# Patient Record
Sex: MALE | Race: WHITE | NOT HISPANIC OR LATINO | URBAN - METROPOLITAN AREA
[De-identification: names, ages, dates, MRNs, and addresses within clinical notes are randomized per-mention and may not be internally consistent; named-entity substitution may affect disease eponyms.]

---

## 2022-07-27 ENCOUNTER — EMERGENCY (EMERGENCY)
Facility: HOSPITAL | Age: 82
LOS: 1 days | Discharge: HOME | End: 2022-07-27
Attending: EMERGENCY MEDICINE
Payer: MEDICARE

## 2022-07-27 VITALS
OXYGEN SATURATION: 97 % | WEIGHT: 240.08 LBS | DIASTOLIC BLOOD PRESSURE: 94 MMHG | HEIGHT: 76 IN | RESPIRATION RATE: 18 BRPM | SYSTOLIC BLOOD PRESSURE: 151 MMHG | HEART RATE: 68 BPM | TEMPERATURE: 98 F

## 2022-07-27 PROCEDURE — 99283 EMERGENCY DEPT VISIT LOW MDM: CPT

## 2022-07-27 PROCEDURE — 99053 MED SERV 10PM-8AM 24 HR FAC: CPT

## 2022-07-27 PROCEDURE — 82962 GLUCOSE BLOOD TEST: CPT

## 2022-07-27 NOTE — ED ADULT NURSE NOTE - CHIEF COMPLAINT QUOTE
PT WAS BROUGHT IN BY EMS FROM [facility] AS PT GOT  LOST DRIVING FROM [place] AND ENDED AT [facility]/DENIES ANY COMPLAINT  UPON ED ARRIVAL

## 2022-07-27 NOTE — ED PROVIDER NOTE - OBJECTIVE STATEMENT
Pt with hx of dementia drove from outside of [place] to [facility].   no reported accidents.  Son present and states pt is in base line states of health.  pt is oriented to himself, his son and his .   No complaitns.  Pmhx Dementia, HTN Pt with hx of dementia, drove from outside of [place] to [facility].   No reported accidents.  Son present and states pt is in base line states of health.  Pt is oriented to himself, his son and his .   No complaints.  Pmhx Dementia, HTN

## 2022-07-27 NOTE — ED PROVIDER NOTE - NSFOLLOWUPCLINICS_GEN_ALL_ED_FT
[facility]  Internal Medicine  [address] [place] [address]  Phone: [phone]  Fax: [phone]

## 2022-07-27 NOTE — ED ADULT NURSE NOTE - OBJECTIVE STATEMENT
the  patient is a 81 y male c brought by ems from [facility]  completely disoriented . son was informed and supposed to come and pick him up today morning

## 2022-07-27 NOTE — ED PROVIDER NOTE - CLINICAL SUMMARY MEDICAL DECISION MAKING FREE TEXT BOX
I had a detailed discussion with son regarding the historical points, exam findings, and any diagnostic results supporting the discharge diagnosis. Safe discharge with son.  I had a detailed discussion with son regarding the historical points, exam findings, and any diagnostic results supporting the discharge diagnosis.

## 2022-07-27 NOTE — ED PROVIDER NOTE - NSFOLLOWUPINSTRUCTIONS_ED_ALL_ED_FT
Dementia is a condition that affects the way the brain functions. It often affects memory and thinking. Usually, dementia gets worse with time and cannot be reversed (progressive dementia). There are many types of dementia, including:  •Alzheimer's disease. This type is the most common.      •Vascular dementia. This type may happen as the result of a stroke.      •Lewy body dementia. This type may happen to people who have Parkinson's disease.      •Frontotemporal dementia. This type is caused by damage to nerve cells (neurons) in certain parts of the brain.      Some people may be affected by more than one type of dementia. This is called mixed dementia.      What are the causes?    Dementia is caused by damage to cells in the brain. The area of the brain and the types of cells damaged determine the type of dementia. Usually, this damage is irreversible or cannot be undone. Some examples of irreversible causes include:  •Conditions that affect the blood vessels of the brain, such as diabetes, heart disease, or blood vessel disease.      •Genetic mutations.      In some cases, changes in the brain may be caused by another condition and can be reversed or slowed. Some examples of reversible causes include:  •Injury to the brain.      •Certain medicines.      •Infection, such as meningitis.      •Metabolic problems, such as vitamin B12 deficiency or thyroid disease.      •Pressure on the brain, such as from a tumor, blood clot, or too much fluid in the brain (hydrocephalus).      •Autoimmune diseases that affect the brain or arteries, such as limbic encephalitis or vasculitis.        What are the signs or symptoms?    Symptoms of dementia depend on the type of dementia. Common signs of dementia include problems with remembering, thinking, problem solving, decision making, and communicating. These signs develop slowly or get worse with time. This may include:  •Problems remembering events or people.      •Having trouble taking a bath or putting clothes on.      •Forgetting appointments or forgetting to pay bills.      •Difficulty planning and preparing meals.      •Having trouble speaking.      •Getting lost easily.      •Changes in behavior or mood.        How is this diagnosed?     This condition is diagnosed by a specialist (neurologist). It is diagnosed based on the history of your symptoms, your medical history, a physical exam, and tests. Tests may include:  •Tests to evaluate brain function, such as memory tests, cognitive tests, and other tests.      •Lab tests, such as blood or urine tests.      •Imaging tests, such as a CT scan, a PET scan, or an MRI.      •Genetic testing. This may be done if other family members have a diagnosis of certain types of dementia.      Your health care provider will talk with you and your family, friends, or caregivers about your history and symptoms.      How is this treated?    Treatment for this condition depends on the cause of the dementia. Progressive dementias, such as Alzheimer's disease, cannot be cured, but there may be treatments that help to manage symptoms.    Treatment might involve taking medicines that may help to:  •Control the dementia.      •Slow down the progression of the dementia.      •Manage symptoms.      In some cases, treating the cause of your dementia can improve symptoms, reverse symptoms, or slow down how quickly your dementia becomes worse.    Your health care provider can direct you to support groups, organizations, and other health care providers who can help with decisions about your care.      Follow these instructions at home:    Medicines     •Take over-the-counter and prescription medicines only as told by your health care provider.      •Use a pill organizer or pill reminder to help you manage your medicines.      •Avoid taking medicines that can affect thinking, such as pain medicines or sleeping medicines.      Lifestyle   •Make healthy lifestyle choices.  •Be physically active as told by your health care provider.      •Do not use any products that contain nicotine or tobacco, such as cigarettes, e-cigarettes, and chewing tobacco. If you need help quitting, ask your health care provider.      •Do not drink alcohol.      •Practice stress-management techniques when you get stressed.      •Spend time with other people.      •Make sure to get quality sleep. These tips can help you get a good night's rest:  •Avoid napping during the day.      •Keep your sleeping area dark and cool.      •Avoid exercising during the few hours before you go to bed.      •Avoid caffeine products in the evening.        Eating and drinking     •Drink enough fluid to keep your urine pale yellow.      •Eat a healthy diet.        General instructions      •Work with your health care provider to determine what you need help with and what your safety needs are.      •Talk with your health care provider about whether it is safe for you to drive.      •If you were given a bracelet that identifies you as a person with memory loss or tracks your location, make sure to wear it at all times.      •Work with your family to make important decisions, such as advance directives, medical power of , or a living will.      •Keep all follow-up visits. This is important.        Where to find more information    •Alzheimer's Association: www.alz.org      •National [place] on Aging: www.mirza.nih.gov/alzheimers      •World Health Organization: www.who.int        Contact a health care provider if:    •You have any new or worsening symptoms.      •You have problems with choking or swallowing.        Get help right away if:    •You feel depressed or sad, or feel that you want to harm yourself.      •Your family members become concerned for your safety.      If you ever feel like you may hurt yourself or others, or have thoughts about taking your own life, get help right away. Go to your nearest emergency department or:   • Call your local emergency services (741 in the [place]       • Call a suicide crisis helpline, such as the National Suicide Prevention Lifeline at 1-782.918.1711. This is open 24 hours a day in the [place]       • Text the Crisis Text Line at 694228 (in the [place]         Summary    •Dementia is a condition that affects the way the brain functions. Dementia often affects memory and thinking.      •Usually, dementia gets worse with time and cannot be reversed (progressive dementia).      •Treatment for this condition depends on the cause of the dementia.      •Work with your health care provider to determine what you need help with and what your safety needs are.      •Your health care provider can direct you to support groups, organizations, and other health care providers who can help with decisions about your care.      This information is not intended to replace advice given to you by your health care provider. Make sure you discuss any questions you have with your health care provider.

## 2022-07-27 NOTE — ED ADULT TRIAGE NOTE - CHIEF COMPLAINT QUOTE
PT WAS BROUGHT IN BY EMS FROM [facility] AS PT GOT  LOST DRIVING FROM [place] AND ENDED IN [facility]/DENIES ANY COMPLAINT  UPON ED ARRIVAL PT WAS BROUGHT IN BY EMS FROM [facility] AS PT GOT  LOST DRIVING FROM [place] AND ENDED AT [facility]/DENIES ANY COMPLAINT  UPON ED ARRIVAL

## 2023-08-16 NOTE — ED PROVIDER NOTE - RESPIRATORY NEGATIVE STATEMENT, MLM
Detail Level: Zone
Prescription Strength Graduated Compression Stockings Recommendations: The patient was counseled that prescription strength graduated compression stockings should be worn for all waking hours. They will follow up with a venous specialist to monitor graduated compression stocking usage and their symptoms.
no chest pain, no cough, and no shortness of breath.

## 2024-01-17 VITALS — SYSTOLIC BLOOD PRESSURE: 119 MMHG | DIASTOLIC BLOOD PRESSURE: 67 MMHG | RESPIRATION RATE: 18 BRPM

## 2024-01-17 VITALS — DIASTOLIC BLOOD PRESSURE: 72 MMHG | RESPIRATION RATE: 18 BRPM | SYSTOLIC BLOOD PRESSURE: 124 MMHG

## 2024-01-17 VITALS — RESPIRATION RATE: 20 BRPM | DIASTOLIC BLOOD PRESSURE: 70 MMHG | SYSTOLIC BLOOD PRESSURE: 155 MMHG

## 2024-01-17 VITALS — RESPIRATION RATE: 18 BRPM | DIASTOLIC BLOOD PRESSURE: 73 MMHG | SYSTOLIC BLOOD PRESSURE: 121 MMHG

## 2024-01-17 VITALS — SYSTOLIC BLOOD PRESSURE: 123 MMHG | RESPIRATION RATE: 18 BRPM | DIASTOLIC BLOOD PRESSURE: 76 MMHG

## 2024-01-17 LAB
ALBUMIN SERPL-MCNC: 3.1 G/DL (ref 3.5–5)
ALP SERPL-CCNC: 50 U/L (ref 38–126)
ALT SERPL-CCNC: 19 U/L (ref 0–50)
AST SERPL-CCNC: 32 U/L (ref 17–59)
BUN SERPL-MCNC: 18 MG/DL (ref 9–20)
CALCIUM SERPL-MCNC: 8.9 MG/DL (ref 8.4–10.2)
CHLORIDE SERPL-SCNC: 104 MMOL/L (ref 98–107)
CO2 SERPL-SCNC: 28 MMOL/L (ref 22–30)
EGFR: > 60
ERYTHROCYTE [DISTWIDTH] IN BLOOD BY AUTOMATED COUNT: 15.9 % (ref 11.5–14.5)
FERRITIN SERPL-MCNC: 216 NG/ML (ref 17.9–464)
FOLATE SERPL-MCNC: 12 NG/ML (ref 2.76–20)
GLUCOSE SERPL-MCNC: 96 MG/DL (ref 70–99)
HCT VFR BLD AUTO: 28.1 % (ref 39–52)
HGB BLD-MCNC: 9.4 G/DL (ref 13–18)
IRON SERPL-MCNC: 73 UG/DL (ref 49–181)
MCHC RBC AUTO-ENTMCNC: 33.5 G/DL (ref 33–37)
MCV RBC AUTO: 97.2 FL (ref 80–94)
NRBC BLD AUTO-RTO: 0 %
PLATELET # BLD AUTO: 145 10^3/UL (ref 130–400)
POTASSIUM SERPL-SCNC: 4.5 MMOL/L (ref 3.5–5.1)
PROT SERPL-MCNC: 5.7 G/DL (ref 6.3–8.2)
SODIUM SERPL-SCNC: 137 MMOL/L (ref 135–145)
TIBC SERPL-MCNC: 209 UG/DL (ref 261–462)
VIT B12 SERPL-MCNC: 312 PG/ML (ref 239–931)

## 2024-01-17 RX ADMIN — TRAZODONE HYDROCHLORIDE 100 MG: 100 TABLET ORAL at 22:30

## 2024-01-17 RX ADMIN — ANTIPRURITIC (ANTI-ITCH) 1 APPLIC: 1 CREAM TOPICAL at 22:33

## 2024-01-17 RX ADMIN — TAMSULOSIN HYDROCHLORIDE 0.4 MG: 0.4 CAPSULE ORAL at 22:31

## 2024-01-17 RX ADMIN — CARVEDILOL 25 MG: 12.5 TABLET, FILM COATED ORAL at 22:29

## 2024-01-17 RX ADMIN — Medication 5 MG: at 22:31

## 2024-01-17 RX ADMIN — OLANZAPINE 7.5 MG: 7.5 TABLET, FILM COATED ORAL at 22:31

## 2024-01-17 RX ADMIN — TRAZODONE HYDROCHLORIDE 25 MG: 50 TABLET ORAL at 22:30

## 2024-01-17 RX ADMIN — ACETAMINOPHEN 650 MG: 325 TABLET ORAL at 23:58

## 2024-01-17 NOTE — CM
"Addendum entered by Anna Hull RN  01/17/24 16:53: "~"CM spoke with VIANNEY Mckeon and Neftali,  at [facility]. They are refusing to have patient return. CM updated patient's wife and sister. They will work with CM for placement. "~"Addendum entered by Anna Hull RN  01/17/24 16:22: "~"VELVET spoke with Linda at [facility]. They are planning to speak with patient's daughter to confirm if they are willing to pay for 1:1. CM will await call back. "~"Addendum entered by Anna Hull RN  01/17/24 15:17: "~"VELVET spoke to Linda from [facility]. She discussed patient's disposition with her director Neftail. Neftali feels that patient requires too much care for them to handle patient. Linda will discuss further with Neftali and the patient's daughter to "~"discuss 1:1 plan. Cm will await call back. "~"Original Note:"~"VELVET spoke with Linda from [facility]. She stated that patient has been having frequent falls and they are concerned he needs a higher level of care. "~"Patient's wife and sister are at bedside. They are in agreement with having the patient return to [facility] and will pay for a 1:1. "~"Linda will discuss this discharge plan with her director. CM will await call back. "

## 2024-01-17 NOTE — CM
Patient's family is requesting referral to be sent to [facility]. CM will send referral via [facility].

## 2024-01-18 VITALS — DIASTOLIC BLOOD PRESSURE: 81 MMHG | SYSTOLIC BLOOD PRESSURE: 159 MMHG | RESPIRATION RATE: 20 BRPM

## 2024-01-18 VITALS — DIASTOLIC BLOOD PRESSURE: 81 MMHG | RESPIRATION RATE: 20 BRPM | SYSTOLIC BLOOD PRESSURE: 159 MMHG

## 2024-01-18 VITALS — SYSTOLIC BLOOD PRESSURE: 128 MMHG | RESPIRATION RATE: 18 BRPM | DIASTOLIC BLOOD PRESSURE: 73 MMHG

## 2024-01-18 VITALS — RESPIRATION RATE: 16 BRPM | DIASTOLIC BLOOD PRESSURE: 69 MMHG | SYSTOLIC BLOOD PRESSURE: 156 MMHG

## 2024-01-18 VITALS — DIASTOLIC BLOOD PRESSURE: 83 MMHG | OXYGEN SATURATION: 99 % | SYSTOLIC BLOOD PRESSURE: 140 MMHG

## 2024-01-18 VITALS — OXYGEN SATURATION: 99 % | DIASTOLIC BLOOD PRESSURE: 83 MMHG | SYSTOLIC BLOOD PRESSURE: 140 MMHG

## 2024-01-18 VITALS — DIASTOLIC BLOOD PRESSURE: 64 MMHG | RESPIRATION RATE: 20 BRPM | SYSTOLIC BLOOD PRESSURE: 144 MMHG

## 2024-01-18 VITALS — DIASTOLIC BLOOD PRESSURE: 75 MMHG | RESPIRATION RATE: 18 BRPM | SYSTOLIC BLOOD PRESSURE: 145 MMHG

## 2024-01-18 VITALS — HEART RATE: 69 BPM | DIASTOLIC BLOOD PRESSURE: 76 MMHG | SYSTOLIC BLOOD PRESSURE: 125 MMHG

## 2024-01-18 LAB
BUN SERPL-MCNC: 19 MG/DL (ref 9–20)
CALCIUM SERPL-MCNC: 8.9 MG/DL (ref 8.4–10.2)
CHLORIDE SERPL-SCNC: 101 MMOL/L (ref 98–107)
CO2 SERPL-SCNC: 29 MMOL/L (ref 22–30)
EGFR: > 60
ERYTHROCYTE [DISTWIDTH] IN BLOOD BY AUTOMATED COUNT: 16.1 % (ref 11.5–14.5)
ESTIMATED CREATININE CLEARANCE: 77 ML/MIN
GLUCOSE SERPL-MCNC: 87 MG/DL (ref 70–99)
HCT VFR BLD AUTO: 30 % (ref 39–52)
HGB BLD-MCNC: 9.9 G/DL (ref 13–18)
INR PPP: 1.11
MCHC RBC AUTO-ENTMCNC: 33 G/DL (ref 33–37)
MCV RBC AUTO: 98 FL (ref 80–94)
NRBC BLD AUTO-RTO: 0 %
PLATELET # BLD AUTO: 164 10^3/UL (ref 130–400)
POTASSIUM SERPL-SCNC: 3.9 MMOL/L (ref 3.5–5.1)
PROTHROMBIN TIME: 14.5 SEC (ref 11.4–14.6)
SODIUM SERPL-SCNC: 138 MMOL/L (ref 135–145)
SQUAMOUS URNS QL MICRO: (no result) /LPF
TRANS CELLS UR QL COMP ASSIST: (no result) /LPF
URINE RED BLOOD CELL: (no result) /HPF (ref 0–2)
URINE WHITE CELL: (no result) /HPF (ref 0–5)

## 2024-01-18 RX ADMIN — OLANZAPINE 7.5 MG: 7.5 TABLET, FILM COATED ORAL at 16:25

## 2024-01-18 RX ADMIN — DULOXETINE 20 MG: 20 CAPSULE, DELAYED RELEASE ORAL at 08:24

## 2024-01-18 RX ADMIN — TRAZODONE HYDROCHLORIDE 100 MG: 100 TABLET ORAL at 21:23

## 2024-01-18 RX ADMIN — ACETAMINOPHEN 650 MG: 325 TABLET ORAL at 08:24

## 2024-01-18 RX ADMIN — ANTIPRURITIC (ANTI-ITCH) 1 APPLIC: 1 CREAM TOPICAL at 08:25

## 2024-01-18 RX ADMIN — ACETAMINOPHEN 650 MG: 325 TABLET ORAL at 20:06

## 2024-01-18 RX ADMIN — TRAZODONE HYDROCHLORIDE 25 MG: 50 TABLET ORAL at 21:24

## 2024-01-18 RX ADMIN — ANTIPRURITIC (ANTI-ITCH) 1 APPLIC: 1 CREAM TOPICAL at 20:09

## 2024-01-18 RX ADMIN — ACETAMINOPHEN 650 MG: 325 TABLET ORAL at 04:40

## 2024-01-18 RX ADMIN — ASPIRIN 81 MG: 81 TABLET, COATED ORAL at 08:24

## 2024-01-18 RX ADMIN — CYANOCOBALAMIN 1000 MCG: 1000 INJECTION, SOLUTION INTRAMUSCULAR; SUBCUTANEOUS at 11:12

## 2024-01-18 RX ADMIN — TAMSULOSIN HYDROCHLORIDE 0.4 MG: 0.4 CAPSULE ORAL at 21:25

## 2024-01-18 RX ADMIN — LAMOTRIGINE 150 MG: 100 TABLET ORAL at 08:23

## 2024-01-18 RX ADMIN — ACETAMINOPHEN 650 MG: 325 TABLET ORAL at 16:25

## 2024-01-18 RX ADMIN — OLANZAPINE 7.5 MG: 7.5 TABLET, FILM COATED ORAL at 09:26

## 2024-01-18 RX ADMIN — OLANZAPINE 7.5 MG: 7.5 TABLET, FILM COATED ORAL at 21:24

## 2024-01-18 RX ADMIN — Medication 5 MG: at 21:24

## 2024-01-18 RX ADMIN — ACETAMINOPHEN 650 MG: 325 TABLET ORAL at 11:12

## 2024-01-18 RX ADMIN — CARVEDILOL 25 MG: 12.5 TABLET, FILM COATED ORAL at 08:24

## 2024-01-18 RX ADMIN — AMLODIPINE BESYLATE 5 MG: 5 TABLET ORAL at 08:25

## 2024-01-18 NOTE — PTCARENOTE
"Received pt from ER @ 2230, family at bedside. Pt slid over to bed, immediately trying to get up, yelling and becoming combative with staff. Pt calmed down but continues to attempt to get OOB, bed alarm placed. Pt AAOx0. Pt with L arm skin tear and "~"bruising throughout body. "

## 2024-01-18 NOTE — WOUNDNOTE
WOC RN Note: Assisted nursing staff (VIANNEY Mendez, RN Akash, PCT Adwoa) with switching patient's bed from Versacare Accumax to a Versacare Air bed. heels off bed with air chair cushion. VIANNEY Mendez to apply silicone foam to R elbow scabbed abrasion.

## 2024-01-18 NOTE — CM
"Addendum entered by Nadine Russ  01/18/24 14:07: "~"Patients sister is Izabella, not Anna."~"CM met with Izabella and patients wife, Aminah. Discussed referrals sent to LTC facilities, both Izabella and Aminah do not feel that patient would benefit from SNF due to his behaviors and do not feel it will be helpful. CM sent message to [facility] as "~"families first choice in [facility]. Additional referrals sent to [facility] and [facility], awaiting response. "~"Original Note:"~"CM spoke with patients sister, Anna Negro, reports she is currently staying in [place] with her sister in law, Aminah, and wanted to provide additional contacts (Anna- [phone], Aminah- [phone]). Anna provided CM with patients "~"daughter, Jory España [phone], POA of patient. CM explained to Anna that patient is currently observation status, if going to SNF, would be private pay. Anna reports patient has LTC insurance and Jory handles financial information. Anna "~"request additional referrals to be sent out that are local to [facility]. CM reports no response from [facility] as of yet, will send additional referrals. "~"CM spoke with patients daughter and HECTOR Corley. Per daughter, her mother lives at [facility], where her father was living before entering [facility] 6 months ago. Jory reports they were surprised to hear that patient cannot return to "~"[facility] and are now ltrying to figure out the next steps for patient. CM discussed PT recommendation of SNF, and with LTC policy, not all policies will cover SNF. Daughter reports she is not sure how beneficial a SNF would be for her "~"father with his behaviors and dementia and is looking more for a LTC facility. Daughter reports the LTC policy is through [facility], daughter is going to call to see if SNF is covered. Daughter reports she lives in [place], however has "~"family that are local who visit her father every day. Daughter reports they do not have time to look at facilities and are asking the medical professionals to lead them in the right direction of facilities they should send patient to. CM reports she "~"cannot recommend one facility over the other, provided Medicare.gov website to patient to look at list of facilities. Additional referrals sent to [facility] and [facility]. "~"CM awaiting response of facilities at this time. VELVET will continue to follow for discarge planning needs."~"Plan; LTC pending accepting facility. "

## 2024-01-18 NOTE — WOUNDNOTE
"[facility] RN note: Patient admitted with recurrent falls, L elbow skin tear, R thigh contusion. Patient for SNF placement. "~"See H&P for complete history."~"PMH:  Alzheimer's, HTN, anxiety/depression."~"Wound Location and type/assessment:  Patient admitted with: L elbow dermal skin tear. R elbow scabbed abrasion. L coccyx/buttocks blanchable red skin. Bruises R hip, inner thighs, feet. "~"Appetite:  on regular diet. "~"Pressure redistribution devices in place:   Sarta Accumax. Patient can be uncooperative as per nursing staff. "~"Plan:  L elbow silicone border foam changed L elbow. Protective silicone border foam applied L coccyx/buttocks. Heels elevated off bed. Air chair cushion placed under sacral/buttocks. t/c Bed tech Noman and requested an air mattress. "~"Will confirm orders with hospitalist and discussed with VIANNEY Bustos.   "~"Care plan to be updated and will follow as needed.  "

## 2024-01-18 NOTE — WOUNDNOTE
"[facility] RN note: Patient admitted with recurrent falls, L elbow skin tear, R thigh contusion. Patient for SNF placement. "~"See H&P for complete history."~"PMH:  Alzheimer's, HTN, anxiety/depression."~"Wound Location and type/assessment:  Patient admitted with: L elbow dermal skin tear. L coccyx/buttocks blanchable red skin. Bruises R hip, inner thighs, feet. "~"Appetite:  on regular diet. "~"Pressure redistribution devices in place:   Zaask Accumax. Patient can be uncooperative as per nursing staff. "~"Plan:  L elbow silicone border foam changed L elbow. Protective silicone border foam applied L coccyx/buttocks. Heels elevated off bed. Air chair cushion placed under sacral/buttocks. t/c Bed tech Noman and requested an air mattress. "~"Will confirm orders with hospitalist and discussed with VIANNEY Bustos.   "~"Care plan to be updated and will follow as needed.  "

## 2024-01-19 ENCOUNTER — HOSPITAL ENCOUNTER (INPATIENT)
Dept: HOSPITAL 99 - 2 NORTH | Age: 84
LOS: 56 days | Discharge: HOSPICE | DRG: 57 | End: 2024-03-15
Payer: MEDICARE

## 2024-01-19 VITALS — RESPIRATION RATE: 18 BRPM | SYSTOLIC BLOOD PRESSURE: 152 MMHG | DIASTOLIC BLOOD PRESSURE: 79 MMHG

## 2024-01-19 VITALS — SYSTOLIC BLOOD PRESSURE: 162 MMHG | RESPIRATION RATE: 18 BRPM | DIASTOLIC BLOOD PRESSURE: 80 MMHG

## 2024-01-19 VITALS — BODY MASS INDEX: 26.1 KG/M2 | BODY MASS INDEX: 23.4 KG/M2

## 2024-01-19 VITALS — SYSTOLIC BLOOD PRESSURE: 139 MMHG | RESPIRATION RATE: 16 BRPM | DIASTOLIC BLOOD PRESSURE: 71 MMHG

## 2024-01-19 VITALS — DIASTOLIC BLOOD PRESSURE: 69 MMHG | RESPIRATION RATE: 20 BRPM | SYSTOLIC BLOOD PRESSURE: 157 MMHG

## 2024-01-19 VITALS — DIASTOLIC BLOOD PRESSURE: 81 MMHG | SYSTOLIC BLOOD PRESSURE: 152 MMHG | RESPIRATION RATE: 18 BRPM

## 2024-01-19 VITALS — DIASTOLIC BLOOD PRESSURE: 79 MMHG | RESPIRATION RATE: 20 BRPM | SYSTOLIC BLOOD PRESSURE: 158 MMHG

## 2024-01-19 DIAGNOSIS — R29.6: ICD-10-CM

## 2024-01-19 DIAGNOSIS — E83.110: ICD-10-CM

## 2024-01-19 DIAGNOSIS — D53.9: ICD-10-CM

## 2024-01-19 DIAGNOSIS — Z79.82: ICD-10-CM

## 2024-01-19 DIAGNOSIS — R00.1: ICD-10-CM

## 2024-01-19 DIAGNOSIS — E86.0: ICD-10-CM

## 2024-01-19 DIAGNOSIS — F02.811: ICD-10-CM

## 2024-01-19 DIAGNOSIS — N40.0: ICD-10-CM

## 2024-01-19 DIAGNOSIS — G30.9: Primary | ICD-10-CM

## 2024-01-19 DIAGNOSIS — S51.012A: ICD-10-CM

## 2024-01-19 DIAGNOSIS — I48.21: ICD-10-CM

## 2024-01-19 DIAGNOSIS — G47.00: ICD-10-CM

## 2024-01-19 DIAGNOSIS — I82.411: ICD-10-CM

## 2024-01-19 DIAGNOSIS — D69.59: ICD-10-CM

## 2024-01-19 DIAGNOSIS — F10.10: ICD-10-CM

## 2024-01-19 DIAGNOSIS — L89.322: ICD-10-CM

## 2024-01-19 DIAGNOSIS — S50.312A: ICD-10-CM
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
## 2024-01-19 DIAGNOSIS — S70.11XA: ICD-10-CM

## 2024-01-19 DIAGNOSIS — I10: ICD-10-CM

## 2024-01-19 DIAGNOSIS — D75.1: ICD-10-CM

## 2024-01-19 DIAGNOSIS — F02.83: ICD-10-CM

## 2024-01-19 DIAGNOSIS — M25.551: ICD-10-CM

## 2024-01-19 DIAGNOSIS — F32.A: ICD-10-CM

## 2024-01-19 DIAGNOSIS — Z11.52: ICD-10-CM

## 2024-01-19 DIAGNOSIS — E87.0: ICD-10-CM

## 2024-01-19 DIAGNOSIS — T42.6X5A: ICD-10-CM

## 2024-01-19 DIAGNOSIS — L89.152: ICD-10-CM

## 2024-01-19 DIAGNOSIS — Z75.1: ICD-10-CM

## 2024-01-19 DIAGNOSIS — Z66: ICD-10-CM

## 2024-01-19 DIAGNOSIS — F02.818: ICD-10-CM

## 2024-01-19 DIAGNOSIS — F02.84: ICD-10-CM

## 2024-01-19 DIAGNOSIS — L73.9: ICD-10-CM

## 2024-01-19 DIAGNOSIS — Z85.038: ICD-10-CM

## 2024-01-19 LAB
BUN SERPL-MCNC: 17 MG/DL (ref 9–20)
CALCIUM SERPL-MCNC: 9 MG/DL (ref 8.4–10.2)
CHLORIDE SERPL-SCNC: 101 MMOL/L (ref 98–107)
CO2 SERPL-SCNC: 29 MMOL/L (ref 22–30)
EGFR: > 60
ERYTHROCYTE [DISTWIDTH] IN BLOOD BY AUTOMATED COUNT: 15.9 % (ref 11.5–14.5)
ESTIMATED CREATININE CLEARANCE: 88 ML/MIN
GLUCOSE SERPL-MCNC: 127 MG/DL (ref 70–99)
HCT VFR BLD AUTO: 32.6 % (ref 39–52)
HGB BLD-MCNC: 10.7 G/DL (ref 13–18)
MAGNESIUM SERPL-MCNC: 2.2 MG/DL (ref 1.6–2.3)
MCHC RBC AUTO-ENTMCNC: 32.8 G/DL (ref 33–37)
MCV RBC AUTO: 97.3 FL (ref 80–94)
PLATELET # BLD AUTO: 169 10^3/UL (ref 130–400)
POTASSIUM SERPL-SCNC: 3.8 MMOL/L (ref 3.5–5.1)
SODIUM SERPL-SCNC: 138 MMOL/L (ref 135–145)

## 2024-01-19 RX ADMIN — Medication 5 MG: at 21:54

## 2024-01-19 RX ADMIN — CYANOCOBALAMIN 1000 MCG: 1000 INJECTION, SOLUTION INTRAMUSCULAR; SUBCUTANEOUS at 09:36

## 2024-01-19 RX ADMIN — ANTIPRURITIC (ANTI-ITCH) 1 APPLIC: 1 CREAM TOPICAL at 09:36

## 2024-01-19 RX ADMIN — ACETAMINOPHEN: 325 TABLET ORAL at 01:23

## 2024-01-19 RX ADMIN — ACETAMINOPHEN 650 MG: 325 TABLET ORAL at 14:09

## 2024-01-19 RX ADMIN — TAMSULOSIN HYDROCHLORIDE 0.4 MG: 0.4 CAPSULE ORAL at 21:54

## 2024-01-19 RX ADMIN — ACETAMINOPHEN: 325 TABLET ORAL at 05:08

## 2024-01-19 RX ADMIN — ACETAMINOPHEN 650 MG: 325 TABLET ORAL at 09:36

## 2024-01-19 RX ADMIN — OLANZAPINE 7.5 MG: 7.5 TABLET, FILM COATED ORAL at 09:35

## 2024-01-19 RX ADMIN — ASPIRIN 81 MG: 81 TABLET, COATED ORAL at 09:36

## 2024-01-19 RX ADMIN — ANTIPRURITIC (ANTI-ITCH) 1 APPLIC: 1 CREAM TOPICAL at 21:54

## 2024-01-19 RX ADMIN — AMLODIPINE BESYLATE 5 MG: 5 TABLET ORAL at 09:36

## 2024-01-19 RX ADMIN — ACETAMINOPHEN 650 MG: 325 TABLET ORAL at 21:54

## 2024-01-19 RX ADMIN — LAMOTRIGINE 150 MG: 100 TABLET ORAL at 09:36

## 2024-01-19 RX ADMIN — DULOXETINE 20 MG: 20 CAPSULE, DELAYED RELEASE ORAL at 09:36

## 2024-01-19 RX ADMIN — ACETAMINOPHEN 650 MG: 325 TABLET ORAL at 17:21

## 2024-01-19 NOTE — CON.MD
"Consultation - Medical"~"-"~"-: "~"patient seen chart reviewed. discussed w dr martin. this patient w hx of dementia is admitted after seventeen falls.  he lives in [facility] and due to his repeated falls they are saying they cannot take him back. kamari valdes is "~"being investigated. he has hx dementia and mood disorder.  his current meds include zypexa 7.5 mg tid with a prn for ten more mg  trazodone 125 mg q hs lamictal 150 mg daily and cymbalta 20 mg daily. according to his med list he also had a prn for "~"lomotil up to qid and takes meds for bp including amlodopine and caredilol.  no history can be obtained from patient given his dementia and his speech is very garbled. patient has had periods of agitation where he has been given even more "~"anticholinergic medications eg zyprexa 10 mg im"~"past medical hx  see above re falls.  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  imaging of brain , chest, hip and cervical spine show no new findings.  hbg is 10.7     bp 152/81"~"fh not relevant"~"substance abuse according to chart hx alcoholism in the very distant past"~"social resides in [place].  .  alternate placement being sought"~"mse unable to engage verbally with patient"~"dx dementia  with hx affective d/o and anxiety "~"recommendations  if you are looking for a reason for the falls look no further than trazodone at hs and heavy doses of anticholinergic medications eg lomotil ordered qid for diarrhea not clear how much he is receiving  and zyprexa. the top dose of "~"zyprexa is usually 20 mg for adults and he is as an 83 year old already receiving in a scheduled dose 22.5 mg. he has an order for ten more via 5 mg prn's and received 10 mg in an im dose since coming here.  i would suggest that in addition to falls "~"he could be suffering from akathisia which is a known side effect of antipsychotic medication.  it can be experienced by the patient as agitation which we are seeing although he was calm when i saw him. .  have dc'ed trazodone  use melatonin for "~"sleep . for now would try ativan prn for agitation  which could also increase risk for falls but would not cause akathisia.  would increase lamictal by 25 mg which may help w agitation.  would continue cymbalta for now although i don't know that he "~"is truly depressed.  lastly would avoid lomotil qid. "~"would check b12 and folate if not already checked given macrocytosis . psych will follow"

## 2024-01-19 NOTE — CON.CAR
"Addendum entered and electronically signed by Miguel Tariq MD  01/19/24 11:50: "~"I saw and examined the patient."~"The NP's note was reviewed and I agree with the note."~"Comment: 83M with dementia and falls. We are called with permanent AF that is slow."~"	- carvedilol held -&gt; probably will need some dose going forward, but less than 25"~"	- I see no long pauses or profound bradycardia-&gt; no indication that PPM would help"~"Original Note:"~"Consultation"~"Consultation Request"~"Date/Time Consultation Requested: 1/19/24 9:30a"~"Date/Time Consultation Performed: 1/19/24 10a"~"Requesting Provider: Dr. Gutierrez"~"Performing Provider: VICENTE Prince for Dr. Tariq"~"Reason for Consultation: slow Afib, falls"~"Medical History"~"-"~"Chief Complaint: fall"~"History of Present Illness: "~"Mr. España is an 84 yo male with HTN, Afib not on OAC, anxiety/depression and dementia, who presents to the ER from [facility] unit after a fall.  They report he has fallen about 17 times in the last 2-3 weeks, not always witnessed "~"by staff so unclear if this is syncope.  He is demented, disoriented and combative this am, therefore history is limited and obtained via his medical records.  He is admitted to the hospitalist service and we are consulted for slow Afib.  Tele shows "~"rates as slow as 43 bpm, no significant pauses.   Coreg is being held currently.  According to his medication list, he is only on ASA, no OAC.    "~"Past Medical History"~"Past Medical History: Other (as above)"~"Social History"~"Tobacco: Non-Smoker"~"Alcohol: Former"~"Personal: "~"Living: Nursing Home ([facility])"~"Family History"~"Family History: Unable to Obtain (dementia, disoriented)"~"Allergies / Home Medications"~"-: "~"Allergy/AdvReac	Type	Severity	Reaction	Status	Date / Time"~"No Known Allergies	Allergy			Verified	01/17/24 12:41"~" Medication	 Instructions	 Recorded	 Confirmed	 Type"~"amlodipine 5 mg tablet	5 mg PO DAILY Blood Pressure	11/30/23	01/17/24	History"~"aspirin 81 mg tablet,delayed	81 mg PO DAILY Blood Clot	11/30/23	01/17/24	History"~"release	Prevention/Tx			"~"carvedilol 25 mg tablet	25 mg PO BID Heart Failure	11/30/23	01/17/24	History"~"duloxetine 20 mg capsule,delayed	20 mg PO DAILY Pain	11/30/23	01/17/24	History"~"release				"~"lamotrigine 150 mg tablet	150 mg PO DAILY Neurological	11/30/23	01/17/24	History"~"	Condition			"~"melatonin 5 mg tablet	5 mg PO HS Sleep	11/30/23	01/17/24	History"~"olanzapine 5 mg disintegrating	5 mg PO BIDPRN PRN anxiety	11/30/23	01/17/24	History"~"tablet				"~"potassium chloride 20 mEq	40 meq PO DAILY Electrolyte	11/30/23	01/17/24	History"~"tablet,extended release(part/cryst)	Repletion			"~"tamsulosin 0.4 mg capsule	0.4 mg PO HS Urinary Issue	11/30/23	01/17/24	History"~"trazodone 100 mg tablet	100 mg PO HS Sleep	11/30/23	01/17/24	History"~"trazodone 50 mg tablet	25 mg PO HS Sleep	11/30/23	01/17/24	History"~"diphenoxylate-atropine 2.5	1 tab PO QID diarrhea	01/17/24	01/17/24	History"~"mg-0.025 mg tablet				"~"olanzapine 7.5 mg tablet	7.5 mg PO TID Mental Health/Anxiety	01/17/24	01/17/24	History"~"Review of Systems"~"-"~"Unable to obtain full review of systems at this time due to: Dementia"~"Physical Exam"~"Vital Signs"~"-: "~"Temp	Pulse	Resp	BP	Pulse Ox"~" 97.7 F 	 61 	 20 	 157/69 	 96 "~" 01/19/24 07:30	 01/19/24 07:30	 01/19/24 07:30	 01/19/24 07:30	 01/19/24 07:30"~"Lab Results"~"-: "~"01/19/24 09:08 "~"01/19/24 09:08 "~"Physical Exam"~"General: Well Developed and Other (agitated/combative)"~"HEENT: Normocephalic and Moist Mucous Membranes"~"Respiratory: Other (did not perform as he was agitated/combative)"~"Cardiac: S1/S2 and Irregular Rhythm (slow Afib)"~"Breast: Deferred by me"~"GI: Other (did not perform as he was agitated/combative)"~"Rectal: Deferred by Provider"~"Skin: Warm and Dry"~"Neuro: Awake and Alert (disoriented, agitated, combative)"~"Psych: Agitated (combative)"~"Impression / Plan"~"-"~"-: "~"Falls - recurrent."~"	- sometimes witnessed by staff and others not, unsure if syncope."~"	- monitor on tele."~"	- could be from slow Afib, Coreg held."~"	- he is unable to provide any symptoms or history."~"	- check echo today."~"Afib - unknown chronicity."~"	- slow rates as low as 43 bpm on tele."~"	- monitor on tele."~"	- check echo today."~"	- [id number] VASC score is 3 (age, HTN), he is on ASA and with recurrent falls and dementia he is not a candidate for OAC at this time. "~"HTN - stable on meds, continue. "~"Dementia - chronic. "~"	- lives at [facility]."~"	- agitated and combative today, requires a 1:1.  "~"Data Reviewed"~"-"~"EKG: Tracing Personally Visualized and interpreted (Afib 67 bpm)"~"Radiology: Report Reviewed by me (cxr: nad)"~"Labs: Labs Reviewed by me"

## 2024-01-19 NOTE — CM
"CM spoke with patients daughter, Jory, discussed patient now inpatient status. CM discussed there has still not been an accepting LTC facility. Jory reports [facility] still the [facility] once choice, CM reports the admissions liaison at "~"[facility] is off today but aware that family is extremely interested in facility. oJry reports she will call [facility] to see if they would accept patient as her mother resides there. CM discussed patient can go to facility as skilled and "~"transition into LTC, patient would require three night inpatient stay. CM provided case management contact number as CM is not here this weekend. CM will continue to follow for discharge planning needs."~"Plan; LTC, pending facility acceptance."

## 2024-01-20 VITALS — SYSTOLIC BLOOD PRESSURE: 128 MMHG | DIASTOLIC BLOOD PRESSURE: 64 MMHG | HEART RATE: 66 BPM

## 2024-01-20 VITALS — RESPIRATION RATE: 18 BRPM | DIASTOLIC BLOOD PRESSURE: 73 MMHG | SYSTOLIC BLOOD PRESSURE: 158 MMHG

## 2024-01-20 VITALS — RESPIRATION RATE: 18 BRPM | DIASTOLIC BLOOD PRESSURE: 79 MMHG | SYSTOLIC BLOOD PRESSURE: 137 MMHG

## 2024-01-20 VITALS — DIASTOLIC BLOOD PRESSURE: 83 MMHG | RESPIRATION RATE: 16 BRPM | SYSTOLIC BLOOD PRESSURE: 109 MMHG

## 2024-01-20 VITALS — RESPIRATION RATE: 16 BRPM | DIASTOLIC BLOOD PRESSURE: 73 MMHG | SYSTOLIC BLOOD PRESSURE: 161 MMHG

## 2024-01-20 RX ADMIN — CEPHALEXIN 500 MG: 500 CAPSULE ORAL at 22:14

## 2024-01-20 RX ADMIN — ACETAMINOPHEN 650 MG: 325 TABLET ORAL at 15:04

## 2024-01-20 RX ADMIN — Medication 5 MG: at 22:14

## 2024-01-20 RX ADMIN — WATER 2.1 ML: 1 INJECTION INTRAMUSCULAR; INTRAVENOUS; SUBCUTANEOUS at 18:05

## 2024-01-20 RX ADMIN — ANTIPRURITIC (ANTI-ITCH) 1 APPLIC: 1 CREAM TOPICAL at 22:10

## 2024-01-20 RX ADMIN — CYANOCOBALAMIN 1000 MCG: 1000 INJECTION, SOLUTION INTRAMUSCULAR; SUBCUTANEOUS at 07:54

## 2024-01-20 RX ADMIN — ACETAMINOPHEN: 325 TABLET ORAL at 05:14

## 2024-01-20 RX ADMIN — ACETAMINOPHEN: 325 TABLET ORAL at 01:08

## 2024-01-20 RX ADMIN — ASPIRIN 81 MG: 81 TABLET, COATED ORAL at 07:54

## 2024-01-20 RX ADMIN — ACETAMINOPHEN 650 MG: 325 TABLET ORAL at 11:00

## 2024-01-20 RX ADMIN — AMLODIPINE BESYLATE 5 MG: 5 TABLET ORAL at 07:54

## 2024-01-20 RX ADMIN — ANTIPRURITIC (ANTI-ITCH) 1 APPLIC: 1 CREAM TOPICAL at 07:54

## 2024-01-20 RX ADMIN — ACETAMINOPHEN: 325 TABLET ORAL at 23:54

## 2024-01-20 RX ADMIN — ACETAMINOPHEN 650 MG: 325 TABLET ORAL at 22:10

## 2024-01-20 RX ADMIN — LAMOTRIGINE 175 MG: 100 TABLET ORAL at 07:54

## 2024-01-20 RX ADMIN — OLANZAPINE 2.5 MG: 10 INJECTION, POWDER, LYOPHILIZED, FOR SOLUTION INTRAMUSCULAR at 18:05

## 2024-01-20 RX ADMIN — TAMSULOSIN HYDROCHLORIDE 0.4 MG: 0.4 CAPSULE ORAL at 22:14

## 2024-01-20 RX ADMIN — LORAZEPAM 0.5 MG: 0.5 TABLET ORAL at 15:04

## 2024-01-20 RX ADMIN — LORAZEPAM 0.5 MG: 0.5 TABLET ORAL at 22:14

## 2024-01-20 RX ADMIN — ACETAMINOPHEN 650 MG: 325 TABLET ORAL at 07:53

## 2024-01-20 RX ADMIN — CEPHALEXIN 500 MG: 500 CAPSULE ORAL at 17:00

## 2024-01-20 RX ADMIN — OLANZAPINE 10 MG: 10 INJECTION, POWDER, LYOPHILIZED, FOR SOLUTION INTRAMUSCULAR at 12:12

## 2024-01-20 RX ADMIN — CARVEDILOL 6.25 MG: 6.25 TABLET, FILM COATED ORAL at 11:00

## 2024-01-20 NOTE — W.PN.UPDATE
"Update Note"~"Progress Note Update"~"-: "~"Pt. who has history of falls in nursing home has had all psych meds except Lamictal stopped during this admission as they were thought to be the cause of his falls.  He required Zyprexa 10 mg. IM at 12:12 today and is now fast asleep and snoring.  "~"Will follow-may need some amount of antipsychotic medication, ideally one that does not cause hypotension or have strong anticholinergic effects. Discussed case with nurse.  "

## 2024-01-20 NOTE — W.PN.CD
"Addendum entered and electronically signed by Miguel Tariq MD  01/20/24 12:15: "~"I saw and examined the patient."~"The NP's note was reviewed and I agree with the note."~"Comment: 83M with AF with slower ventricular response on carvedilol 25 po BID. There were no long pauses (&gt;3 seconds) or profound bradycardia. Rhythm is improved off BB"~"	- hold carvedilol"~"	- when/if HR averages over 100, resume carvedilol 6.25 po BID"~"	- I will sign off"~"	- follow up with me prn (if PMD feels it is indicated). I see Mr. España's wife."~"Original Note:"~"Today's Communication / Plan"~"-"~"-: "~"-remain off BB and follow tele"~"Impression / Plan"~"-"~"-: "~"Assessment/Plan: 84 yo male with HTN, Afib not on OAC, anxiety/depression and dementia, who presented to the ER from [facility] with recurrent falls and details unclear. We were consulted since HR slow, and BB held"~"Falls - recurrent."~"	-details unclear"~"	-PT/OT"~"	-tele with mild jason, but no severe jason or long pauses on my review- remain off BB"~"	-echo normal: Normal left ventricular systolic function. Left ventricular ejection fraction is 60-65%. Mild mitral regurgitation. Mild tricuspid regurgitation. Estimated pulmonary artery pressure of 30 mmHg"~"Afib - unknown chronicity."~"	- off BB as above, for bradycardia"~"	- [id number] VASC score is 3 (age, HTN), he is on ASA and with recurrent falls and dementia he is not a candidate for OAC at this time. "~"HTN:"~"	-stable, monitor with medicine adjustments"~"Dementia - chronic. "~"	- cooperative for me"~"	-no distress"~"Physical Exam"~"Vital Signs/Labs"~"-: "~"Vital Signs"~"Temp	Pulse	Resp	BP	Pulse Ox"~" 97.8 F 	 66 	 16 	 128/65 	 97 "~" 01/20/24 07:54	 01/20/24 11:00	 01/20/24 07:54	 01/20/24 11:00	 01/20/24 07:54"~"01/19/24 09:08 "~"01/19/24 09:08 "~"PT	 14.5 Sec (11.4-14.6)  	01/18/24  09:45    "~"INR	 1.11  	01/18/24  09:45    "~"Magnesium	 2.2 mg/dl (1.6-2.3)  	01/19/24  09:08    "~"Physical Exam"~"Constitutional: No acute distress"~"EENT: Anicteric"~"Cardiovascular: Rhythm/rate is irregular"~"Respiratory: Respiratory effort normal and Lungs clear to auscul."~"GI: Soft, Non tender and Normal bowel sounds"~"Neuro/Psych: Alert"~"Data Reviewed"~"-"~"-: "~"Date of Service:  January 20, 2024"~"EKG: Other (tele afib, bradycardia)"

## 2024-01-21 VITALS — RESPIRATION RATE: 16 BRPM | DIASTOLIC BLOOD PRESSURE: 75 MMHG | SYSTOLIC BLOOD PRESSURE: 152 MMHG

## 2024-01-21 VITALS — SYSTOLIC BLOOD PRESSURE: 132 MMHG | DIASTOLIC BLOOD PRESSURE: 69 MMHG | RESPIRATION RATE: 16 BRPM

## 2024-01-21 VITALS — RESPIRATION RATE: 16 BRPM | SYSTOLIC BLOOD PRESSURE: 131 MMHG | DIASTOLIC BLOOD PRESSURE: 68 MMHG

## 2024-01-21 VITALS — RESPIRATION RATE: 18 BRPM | SYSTOLIC BLOOD PRESSURE: 152 MMHG | DIASTOLIC BLOOD PRESSURE: 76 MMHG

## 2024-01-21 VITALS — DIASTOLIC BLOOD PRESSURE: 75 MMHG | RESPIRATION RATE: 16 BRPM | SYSTOLIC BLOOD PRESSURE: 148 MMHG

## 2024-01-21 VITALS — DIASTOLIC BLOOD PRESSURE: 65 MMHG | RESPIRATION RATE: 18 BRPM | SYSTOLIC BLOOD PRESSURE: 128 MMHG

## 2024-01-21 VITALS — SYSTOLIC BLOOD PRESSURE: 131 MMHG | DIASTOLIC BLOOD PRESSURE: 62 MMHG | HEART RATE: 79 BPM

## 2024-01-21 LAB
BUN SERPL-MCNC: 18 MG/DL (ref 9–20)
CALCIUM SERPL-MCNC: 9.1 MG/DL (ref 8.4–10.2)
CHLORIDE SERPL-SCNC: 101 MMOL/L (ref 98–107)
CO2 SERPL-SCNC: 29 MMOL/L (ref 22–30)
EGFR: > 60
ERYTHROCYTE [DISTWIDTH] IN BLOOD BY AUTOMATED COUNT: 16.1 % (ref 11.5–14.5)
ESTIMATED CREATININE CLEARANCE: 77 ML/MIN
GLUCOSE SERPL-MCNC: 134 MG/DL (ref 70–99)
HCT VFR BLD AUTO: 33.3 % (ref 39–52)
HGB BLD-MCNC: 11.4 G/DL (ref 13–18)
MCHC RBC AUTO-ENTMCNC: 34.2 G/DL (ref 33–37)
MCV RBC AUTO: 94.1 FL (ref 80–94)
PLATELET # BLD AUTO: 165 10^3/UL (ref 130–400)
POTASSIUM SERPL-SCNC: 3.9 MMOL/L (ref 3.5–5.1)
SODIUM SERPL-SCNC: 135 MMOL/L (ref 135–145)

## 2024-01-21 RX ADMIN — CEPHALEXIN 500 MG: 500 CAPSULE ORAL at 10:00

## 2024-01-21 RX ADMIN — ACETAMINOPHEN 650 MG: 325 TABLET ORAL at 21:40

## 2024-01-21 RX ADMIN — AMLODIPINE BESYLATE 5 MG: 5 TABLET ORAL at 10:02

## 2024-01-21 RX ADMIN — ACETAMINOPHEN: 325 TABLET ORAL at 03:57

## 2024-01-21 RX ADMIN — LORAZEPAM 0.5 MG: 0.5 TABLET ORAL at 04:53

## 2024-01-21 RX ADMIN — LORAZEPAM 0.5 MG: 0.5 TABLET ORAL at 18:26

## 2024-01-21 RX ADMIN — ACETAMINOPHEN 650 MG: 325 TABLET ORAL at 15:11

## 2024-01-21 RX ADMIN — LAMOTRIGINE 175 MG: 100 TABLET ORAL at 10:00

## 2024-01-21 RX ADMIN — ACETAMINOPHEN: 325 TABLET ORAL at 12:30

## 2024-01-21 RX ADMIN — ASPIRIN 81 MG: 81 TABLET, COATED ORAL at 09:59

## 2024-01-21 RX ADMIN — TAMSULOSIN HYDROCHLORIDE 0.4 MG: 0.4 CAPSULE ORAL at 21:41

## 2024-01-21 RX ADMIN — CEPHALEXIN 500 MG: 500 CAPSULE ORAL at 15:11

## 2024-01-21 RX ADMIN — CEPHALEXIN 500 MG: 500 CAPSULE ORAL at 17:43

## 2024-01-21 RX ADMIN — ANTIPRURITIC (ANTI-ITCH) 1 APPLIC: 1 CREAM TOPICAL at 21:41

## 2024-01-21 RX ADMIN — ACETAMINOPHEN 650 MG: 325 TABLET ORAL at 10:01

## 2024-01-21 RX ADMIN — ANTIPRURITIC (ANTI-ITCH) 1 APPLIC: 1 CREAM TOPICAL at 10:10

## 2024-01-21 RX ADMIN — CITALOPRAM HYDROBROMIDE 20 MG: 20 TABLET ORAL at 17:43

## 2024-01-21 RX ADMIN — Medication 5 MG: at 21:41

## 2024-01-21 RX ADMIN — LORAZEPAM 0.5 MG: 0.5 TABLET ORAL at 12:07

## 2024-01-21 RX ADMIN — CEPHALEXIN 500 MG: 500 CAPSULE ORAL at 21:41

## 2024-01-21 NOTE — PTCARENOTE
Assumed care of pt from previous nurse. Pt provided ativan for agitation with positive results. Pt with no noted pain. Pt call bell is within reach, pt does not ring, 1:1 at bedside, rounding in place. 1:1 calls for pt, will cont to monitor.
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
## 2024-01-21 NOTE — CM
"Addendum entered by Ellen Denny  01/21/24 11:10: "~"spoke again with daughter she is concerned about medications and update provided re SNF options. CM will continue to try to reach admissions. "~"Original Note:"~"CM spoke with patient daughter 1/20 additional referrals sent and awaiting bed availability. Patient family continue to hope for [facility], however, no response to call and VM. CM will continue to look for available bed.  CM will continue to follow for "~"discharge planning needs."~"Plan; SNF"

## 2024-01-22 VITALS — DIASTOLIC BLOOD PRESSURE: 46 MMHG | SYSTOLIC BLOOD PRESSURE: 100 MMHG | RESPIRATION RATE: 16 BRPM

## 2024-01-22 VITALS — RESPIRATION RATE: 18 BRPM | DIASTOLIC BLOOD PRESSURE: 68 MMHG | SYSTOLIC BLOOD PRESSURE: 163 MMHG

## 2024-01-22 VITALS — SYSTOLIC BLOOD PRESSURE: 121 MMHG | RESPIRATION RATE: 18 BRPM | DIASTOLIC BLOOD PRESSURE: 64 MMHG

## 2024-01-22 VITALS — RESPIRATION RATE: 16 BRPM

## 2024-01-22 VITALS — HEART RATE: 65 BPM | OXYGEN SATURATION: 99 %

## 2024-01-22 VITALS — DIASTOLIC BLOOD PRESSURE: 68 MMHG | RESPIRATION RATE: 14 BRPM | SYSTOLIC BLOOD PRESSURE: 148 MMHG

## 2024-01-22 LAB
BUN SERPL-MCNC: 15 MG/DL (ref 9–20)
CALCIUM SERPL-MCNC: 8.9 MG/DL (ref 8.4–10.2)
CHLORIDE SERPL-SCNC: 103 MMOL/L (ref 98–107)
CO2 SERPL-SCNC: 32 MMOL/L (ref 22–30)
EGFR: > 60
ERYTHROCYTE [DISTWIDTH] IN BLOOD BY AUTOMATED COUNT: 15.9 % (ref 11.5–14.5)
ESTIMATED CREATININE CLEARANCE: 77 ML/MIN
GLUCOSE SERPL-MCNC: 86 MG/DL (ref 70–99)
HCT VFR BLD AUTO: 36.4 % (ref 39–52)
HGB BLD-MCNC: 11.9 G/DL (ref 13–18)
MCHC RBC AUTO-ENTMCNC: 32.7 G/DL (ref 33–37)
MCV RBC AUTO: 99.2 FL (ref 80–94)
PLATELET # BLD AUTO: 170 10^3/UL (ref 130–400)
POTASSIUM SERPL-SCNC: 3.9 MMOL/L (ref 3.5–5.1)
SODIUM SERPL-SCNC: 138 MMOL/L (ref 135–145)

## 2024-01-22 RX ADMIN — AMLODIPINE BESYLATE 5 MG: 5 TABLET ORAL at 08:49

## 2024-01-22 RX ADMIN — OLANZAPINE 2.5 MG: 2.5 TABLET, FILM COATED ORAL at 17:22

## 2024-01-22 RX ADMIN — ACETAMINOPHEN 650 MG: 325 TABLET ORAL at 08:50

## 2024-01-22 RX ADMIN — ACETAMINOPHEN: 325 TABLET ORAL at 04:24

## 2024-01-22 RX ADMIN — ANTIPRURITIC (ANTI-ITCH) 1 APPLIC: 1 CREAM TOPICAL at 20:39

## 2024-01-22 RX ADMIN — ACETAMINOPHEN: 325 TABLET ORAL at 00:32

## 2024-01-22 RX ADMIN — ACETAMINOPHEN 650 MG: 325 TABLET ORAL at 20:38

## 2024-01-22 RX ADMIN — CITALOPRAM HYDROBROMIDE 20 MG: 20 TABLET ORAL at 08:51

## 2024-01-22 RX ADMIN — ASPIRIN 81 MG: 81 TABLET, COATED ORAL at 08:48

## 2024-01-22 RX ADMIN — ANTIPRURITIC (ANTI-ITCH) 1 APPLIC: 1 CREAM TOPICAL at 08:51

## 2024-01-22 RX ADMIN — ACETAMINOPHEN 650 MG: 325 TABLET ORAL at 13:19

## 2024-01-22 RX ADMIN — Medication 5 MG: at 20:46

## 2024-01-22 RX ADMIN — ACETAMINOPHEN 650 MG: 325 TABLET ORAL at 17:22

## 2024-01-22 RX ADMIN — OLANZAPINE 2.5 MG: 10 INJECTION, POWDER, LYOPHILIZED, FOR SOLUTION INTRAMUSCULAR at 18:10

## 2024-01-22 RX ADMIN — CEPHALEXIN 500 MG: 500 CAPSULE ORAL at 17:22

## 2024-01-22 RX ADMIN — CEPHALEXIN 500 MG: 500 CAPSULE ORAL at 13:19

## 2024-01-22 RX ADMIN — TAMSULOSIN HYDROCHLORIDE 0.4 MG: 0.4 CAPSULE ORAL at 20:46

## 2024-01-22 RX ADMIN — CEPHALEXIN 500 MG: 500 CAPSULE ORAL at 20:46

## 2024-01-22 RX ADMIN — LAMOTRIGINE 175 MG: 100 TABLET ORAL at 08:49

## 2024-01-22 RX ADMIN — CEPHALEXIN 500 MG: 500 CAPSULE ORAL at 08:49

## 2024-01-22 RX ADMIN — WATER 2.1 ML: 1 INJECTION INTRAMUSCULAR; INTRAVENOUS; SUBCUTANEOUS at 18:10

## 2024-01-22 NOTE — CM
"Chart reviewed and patient is currently on 1:1 supervision. Plan is for skilled placement however patient will need to be off 1:1 for placement. Patient cannot be on med sitter for nursing home placement. Nursing and physician made aware."~"Plan; Placement, patient needs to be off 1:1 for placement, patient cannot be on med sitter. "

## 2024-01-22 NOTE — PTCARENOTE
Pt with increasing agitation. Attempting to get out of bed. Toileting attempted but pt pushing away. Unable to redirect. PRN Zyprexa 2.5mg IM administered. 1:1 observation maintained.

## 2024-01-23 VITALS — DIASTOLIC BLOOD PRESSURE: 97 MMHG | SYSTOLIC BLOOD PRESSURE: 170 MMHG | RESPIRATION RATE: 18 BRPM

## 2024-01-23 VITALS — SYSTOLIC BLOOD PRESSURE: 129 MMHG | RESPIRATION RATE: 16 BRPM | DIASTOLIC BLOOD PRESSURE: 75 MMHG

## 2024-01-23 VITALS — SYSTOLIC BLOOD PRESSURE: 162 MMHG | DIASTOLIC BLOOD PRESSURE: 69 MMHG | RESPIRATION RATE: 18 BRPM

## 2024-01-23 VITALS — SYSTOLIC BLOOD PRESSURE: 123 MMHG | RESPIRATION RATE: 18 BRPM | DIASTOLIC BLOOD PRESSURE: 54 MMHG

## 2024-01-23 VITALS — SYSTOLIC BLOOD PRESSURE: 112 MMHG | DIASTOLIC BLOOD PRESSURE: 55 MMHG | RESPIRATION RATE: 18 BRPM

## 2024-01-23 VITALS — DIASTOLIC BLOOD PRESSURE: 54 MMHG | SYSTOLIC BLOOD PRESSURE: 123 MMHG | RESPIRATION RATE: 18 BRPM

## 2024-01-23 LAB
BUN SERPL-MCNC: 19 MG/DL (ref 9–20)
CALCIUM SERPL-MCNC: 8.9 MG/DL (ref 8.4–10.2)
CHLORIDE SERPL-SCNC: 102 MMOL/L (ref 98–107)
CO2 SERPL-SCNC: 29 MMOL/L (ref 22–30)
EGFR: > 60
ERYTHROCYTE [DISTWIDTH] IN BLOOD BY AUTOMATED COUNT: 16 % (ref 11.5–14.5)
ESTIMATED CREATININE CLEARANCE: 77 ML/MIN
GLUCOSE SERPL-MCNC: 87 MG/DL (ref 70–99)
HCT VFR BLD AUTO: 33.7 % (ref 39–52)
HGB BLD-MCNC: 11.2 G/DL (ref 13–18)
MCHC RBC AUTO-ENTMCNC: 33.2 G/DL (ref 33–37)
MCV RBC AUTO: 98.8 FL (ref 80–94)
PLATELET # BLD AUTO: 157 10^3/UL (ref 130–400)
POTASSIUM SERPL-SCNC: 3.7 MMOL/L (ref 3.5–5.1)
SODIUM SERPL-SCNC: 139 MMOL/L (ref 135–145)

## 2024-01-23 RX ADMIN — ACETAMINOPHEN 650 MG: 325 TABLET ORAL at 19:17

## 2024-01-23 RX ADMIN — ASPIRIN 81 MG: 81 TABLET, COATED ORAL at 09:00

## 2024-01-23 RX ADMIN — CEPHALEXIN 500 MG: 500 CAPSULE ORAL at 17:29

## 2024-01-23 RX ADMIN — ACETAMINOPHEN 650 MG: 325 TABLET ORAL at 09:00

## 2024-01-23 RX ADMIN — ACETAMINOPHEN: 325 TABLET ORAL at 04:58

## 2024-01-23 RX ADMIN — LORAZEPAM 0.5 MG: 0.5 TABLET ORAL at 10:59

## 2024-01-23 RX ADMIN — ANTIPRURITIC (ANTI-ITCH) 1 APPLIC: 1 CREAM TOPICAL at 19:17

## 2024-01-23 RX ADMIN — CEPHALEXIN 500 MG: 500 CAPSULE ORAL at 12:29

## 2024-01-23 RX ADMIN — ACETAMINOPHEN 650 MG: 325 TABLET ORAL at 12:29

## 2024-01-23 RX ADMIN — ACETAMINOPHEN: 325 TABLET ORAL at 00:14

## 2024-01-23 RX ADMIN — AMLODIPINE BESYLATE 5 MG: 5 TABLET ORAL at 09:00

## 2024-01-23 RX ADMIN — CITALOPRAM HYDROBROMIDE 20 MG: 20 TABLET ORAL at 09:00

## 2024-01-23 RX ADMIN — OLANZAPINE 5 MG: 5 TABLET, FILM COATED ORAL at 17:29

## 2024-01-23 RX ADMIN — CEPHALEXIN 500 MG: 500 CAPSULE ORAL at 21:36

## 2024-01-23 RX ADMIN — LORAZEPAM 0.5 MG: 0.5 TABLET ORAL at 02:02

## 2024-01-23 RX ADMIN — Medication 5 MG: at 21:36

## 2024-01-23 RX ADMIN — LAMOTRIGINE 175 MG: 100 TABLET ORAL at 09:00

## 2024-01-23 RX ADMIN — TAMSULOSIN HYDROCHLORIDE 0.4 MG: 0.4 CAPSULE ORAL at 21:36

## 2024-01-23 RX ADMIN — ACETAMINOPHEN: 325 TABLET ORAL at 23:31

## 2024-01-23 RX ADMIN — LORAZEPAM 0.5 MG: 0.5 TABLET ORAL at 19:16

## 2024-01-23 RX ADMIN — CEPHALEXIN 500 MG: 500 CAPSULE ORAL at 09:01

## 2024-01-23 RX ADMIN — ANTIPRURITIC (ANTI-ITCH) 1 APPLIC: 1 CREAM TOPICAL at 09:01

## 2024-01-23 RX ADMIN — ACETAMINOPHEN: 325 TABLET ORAL at 16:26

## 2024-01-23 NOTE — CM
" reviewed patient's chart and patient remains on 1:1, plan is for nursing home placement however patient will need to be off 1:1 for placement,  spoke with patient's spouse and patient's daughter regarding placement. "~"patient's daughter has selected [facility], application form for admission was placed in patient's room family will need to complete. Daughter who is out of state plans on looking for online application form for [facility]. "~"Plan; Skilled placement when patient is off 1:1. "

## 2024-01-23 NOTE — PTCARENOTE
"Pt combative and making repeated attempts to get OOB. Fátima- chair order requested and rec'd from Dr. Gaston. Request to remove Tele also approved by Dr. Gaston as Cardiology has signed off and Pt does not meet protocol. Pt medicated with PRN ativan "~"and scheduled meds as ordered. "

## 2024-01-24 VITALS — RESPIRATION RATE: 18 BRPM | SYSTOLIC BLOOD PRESSURE: 127 MMHG | DIASTOLIC BLOOD PRESSURE: 60 MMHG

## 2024-01-24 VITALS — SYSTOLIC BLOOD PRESSURE: 106 MMHG | RESPIRATION RATE: 18 BRPM | DIASTOLIC BLOOD PRESSURE: 62 MMHG

## 2024-01-24 RX ADMIN — ACETAMINOPHEN 650 MG: 325 TABLET ORAL at 08:53

## 2024-01-24 RX ADMIN — Medication 5 MG: at 22:53

## 2024-01-24 RX ADMIN — AMLODIPINE BESYLATE 5 MG: 5 TABLET ORAL at 08:53

## 2024-01-24 RX ADMIN — CEPHALEXIN 500 MG: 500 CAPSULE ORAL at 22:53

## 2024-01-24 RX ADMIN — TAMSULOSIN HYDROCHLORIDE 0.4 MG: 0.4 CAPSULE ORAL at 22:54

## 2024-01-24 RX ADMIN — CEPHALEXIN 500 MG: 500 CAPSULE ORAL at 08:53

## 2024-01-24 RX ADMIN — ACETAMINOPHEN 650 MG: 325 TABLET ORAL at 12:30

## 2024-01-24 RX ADMIN — LORAZEPAM 0.5 MG: 0.5 TABLET ORAL at 08:52

## 2024-01-24 RX ADMIN — ANTIPRURITIC (ANTI-ITCH) 1 APPLIC: 1 CREAM TOPICAL at 20:48

## 2024-01-24 RX ADMIN — ASPIRIN 81 MG: 81 TABLET, COATED ORAL at 08:53

## 2024-01-24 RX ADMIN — CEPHALEXIN 500 MG: 500 CAPSULE ORAL at 12:31

## 2024-01-24 RX ADMIN — LAMOTRIGINE 175 MG: 100 TABLET ORAL at 08:52

## 2024-01-24 RX ADMIN — ANTIPRURITIC (ANTI-ITCH) 1 APPLIC: 1 CREAM TOPICAL at 08:52

## 2024-01-24 RX ADMIN — LORAZEPAM 0.5 MG: 0.5 TABLET ORAL at 22:53

## 2024-01-24 RX ADMIN — CEPHALEXIN 500 MG: 500 CAPSULE ORAL at 17:03

## 2024-01-24 RX ADMIN — ACETAMINOPHEN: 325 TABLET ORAL at 04:52

## 2024-01-24 RX ADMIN — CITALOPRAM HYDROBROMIDE 20 MG: 20 TABLET ORAL at 08:52

## 2024-01-24 RX ADMIN — OLANZAPINE 5 MG: 5 TABLET, FILM COATED ORAL at 17:03

## 2024-01-24 RX ADMIN — ACETAMINOPHEN 650 MG: 325 TABLET ORAL at 20:49

## 2024-01-24 RX ADMIN — ACETAMINOPHEN 650 MG: 325 TABLET ORAL at 17:03

## 2024-01-24 NOTE — W.PN.UPDATE
"Update Note"~"Progress Note Update"~"-: "~"patient seen chart reviewed.  spoke with nursing. patient has had periods of agitation.  yesterday zyprexa was increased to 5 mg daily. it is too soon to see an impact. he has been receiving one to three doses of prn ativan daily. he was in a "~"recliner this evening from 2 am to about an hour and a half ago. he is now in bed sleeping deeply and snoring loudly. i tried half heartedly to wake him but it seemed after my discussion w nursing that he needed to sleep.  on the one hand we do not "~"want him needing large amounts of ativan ...on the other zyprexa is very anticholinergic and i am reluctant to increase beyond 5 mg.  will see how he does between now and tomorrow. as stated right now he is very calm....if he continues tomorrow to "~"require several doses of atvain will consider a standing am dose of ativan to see if that would prevent him from getting to the point of needing multiple doses and / or consider switch to risperdal which is not quite so anticholinergic although it "~"does have other side effects. anothe option would be to increase lamictal but i just increased it on the 20th and to decrease risk of annamaria juany in psych we only increase by 25 mg every seven days.  so this coming saturday could increase am "~"dose.  should consider perhaps moving him closer to the nurses station. nursing plans to try a recliner as well.  need to dc med sitter restraints etc to get him into nh. "

## 2024-01-24 NOTE — CM
" spoke with admissions at [facility] and per admissions they may not be able to accept patient as patient still with agitation and they do not any beds in their long term dementia unit.  reviewed other options with "~"patient's daughter and will need to wait till patient stabilizes and then proceed with placement. "~"Plan; Skilled placement. "

## 2024-01-24 NOTE — PTCARENOTE
"Pt removed from 1:1 and placed on Medsitter at 11am. Medsitter then had downtime until approx 1600. No issues- wife was visiting for part of the time.  Pt more calm and slightly more re-directable today. Fátima-chair order not renewed at this time. Pt "~"has great appetite and is motivated to be cooperative when assisted with meals. "

## 2024-01-25 VITALS — RESPIRATION RATE: 16 BRPM | DIASTOLIC BLOOD PRESSURE: 51 MMHG | SYSTOLIC BLOOD PRESSURE: 113 MMHG

## 2024-01-25 VITALS — RESPIRATION RATE: 18 BRPM | SYSTOLIC BLOOD PRESSURE: 115 MMHG | DIASTOLIC BLOOD PRESSURE: 58 MMHG

## 2024-01-25 VITALS — RESPIRATION RATE: 18 BRPM | DIASTOLIC BLOOD PRESSURE: 83 MMHG | SYSTOLIC BLOOD PRESSURE: 163 MMHG

## 2024-01-25 RX ADMIN — Medication 5 MG: at 21:14

## 2024-01-25 RX ADMIN — ACETAMINOPHEN 650 MG: 325 TABLET ORAL at 12:33

## 2024-01-25 RX ADMIN — CITALOPRAM HYDROBROMIDE 20 MG: 20 TABLET ORAL at 08:11

## 2024-01-25 RX ADMIN — ANTIPRURITIC (ANTI-ITCH) 1 APPLIC: 1 CREAM TOPICAL at 09:29

## 2024-01-25 RX ADMIN — LAMOTRIGINE 175 MG: 100 TABLET ORAL at 08:12

## 2024-01-25 RX ADMIN — CEPHALEXIN 500 MG: 500 CAPSULE ORAL at 08:11

## 2024-01-25 RX ADMIN — ACETAMINOPHEN: 325 TABLET ORAL at 04:47

## 2024-01-25 RX ADMIN — ACETAMINOPHEN 650 MG: 325 TABLET ORAL at 19:13

## 2024-01-25 RX ADMIN — ANTIPRURITIC (ANTI-ITCH) 1 APPLIC: 1 CREAM TOPICAL at 19:12

## 2024-01-25 RX ADMIN — ASPIRIN 81 MG: 81 TABLET, COATED ORAL at 08:12

## 2024-01-25 RX ADMIN — ACETAMINOPHEN: 325 TABLET ORAL at 15:27

## 2024-01-25 RX ADMIN — ACETAMINOPHEN 650 MG: 325 TABLET ORAL at 08:13

## 2024-01-25 RX ADMIN — TAMSULOSIN HYDROCHLORIDE 0.4 MG: 0.4 CAPSULE ORAL at 21:14

## 2024-01-25 RX ADMIN — CEPHALEXIN 500 MG: 500 CAPSULE ORAL at 12:34

## 2024-01-25 RX ADMIN — ACETAMINOPHEN: 325 TABLET ORAL at 00:33

## 2024-01-25 RX ADMIN — AMLODIPINE BESYLATE 5 MG: 5 TABLET ORAL at 09:28

## 2024-01-25 RX ADMIN — OLANZAPINE 5 MG: 5 TABLET, FILM COATED ORAL at 17:54

## 2024-01-25 RX ADMIN — LORAZEPAM 0.5 MG: 0.5 TABLET ORAL at 21:14

## 2024-01-25 NOTE — W.PN.UPDATE
"Update Note"~"Progress Note Update"~"-: "~"Patient is calmer now, possibly as his Zyprexa was increased and/or his sister is visiting. Presently not aggressive or agitated."~"For now would continue current meds."~"Discussed with sister."

## 2024-01-25 NOTE — CM
"Addendum entered by Rosalva Castaneda  01/25/24 14:26: "~"Referrals sent to [facility],  [facility] with [facility], admissions, Candice [phone], and per Candice they have a psychiatric NP on staff which may benefit patient.  will "~"follow with patient's activity level,  and alertness.  will invite facilities out to meet with patient in person.  Calls placed to patient's spouse and daughter. "~"Original Note:"~" reviewed patient's chart and per updated progress notes patient is off 1:1 nursing but patient has been sleeping most of the time.  spoke with family and discussed referrals and referrals sent to [facility] "~"[place].  reached out again to admissions at [facility] but they do not have any beds for patient. Update provided to family"~"Plan; Skilled placement with LTC option. "

## 2024-01-26 VITALS — RESPIRATION RATE: 16 BRPM | SYSTOLIC BLOOD PRESSURE: 130 MMHG | DIASTOLIC BLOOD PRESSURE: 57 MMHG

## 2024-01-26 VITALS — DIASTOLIC BLOOD PRESSURE: 73 MMHG | RESPIRATION RATE: 16 BRPM | SYSTOLIC BLOOD PRESSURE: 109 MMHG

## 2024-01-26 VITALS — DIASTOLIC BLOOD PRESSURE: 63 MMHG | SYSTOLIC BLOOD PRESSURE: 133 MMHG | RESPIRATION RATE: 18 BRPM

## 2024-01-26 RX ADMIN — ACETAMINOPHEN 650 MG: 325 TABLET ORAL at 20:25

## 2024-01-26 RX ADMIN — OLANZAPINE 5 MG: 5 TABLET, FILM COATED ORAL at 17:32

## 2024-01-26 RX ADMIN — ACETAMINOPHEN: 325 TABLET ORAL at 00:07

## 2024-01-26 RX ADMIN — ACETAMINOPHEN: 325 TABLET ORAL at 16:50

## 2024-01-26 RX ADMIN — ANTIPRURITIC (ANTI-ITCH) 1 APPLIC: 1 CREAM TOPICAL at 09:25

## 2024-01-26 RX ADMIN — TAMSULOSIN HYDROCHLORIDE 0.4 MG: 0.4 CAPSULE ORAL at 21:29

## 2024-01-26 RX ADMIN — ASPIRIN 81 MG: 81 TABLET, COATED ORAL at 09:24

## 2024-01-26 RX ADMIN — ANTIPRURITIC (ANTI-ITCH) 1 APPLIC: 1 CREAM TOPICAL at 20:25

## 2024-01-26 RX ADMIN — Medication 5 MG: at 21:29

## 2024-01-26 RX ADMIN — ACETAMINOPHEN: 325 TABLET ORAL at 04:24

## 2024-01-26 RX ADMIN — LORAZEPAM 0.5 MG: 0.5 TABLET ORAL at 05:51

## 2024-01-26 RX ADMIN — AMLODIPINE BESYLATE 5 MG: 5 TABLET ORAL at 09:25

## 2024-01-26 RX ADMIN — LAMOTRIGINE 175 MG: 100 TABLET ORAL at 09:24

## 2024-01-26 RX ADMIN — ACETAMINOPHEN 650 MG: 325 TABLET ORAL at 09:24

## 2024-01-26 RX ADMIN — LORAZEPAM 0.5 MG: 0.5 TABLET ORAL at 21:45

## 2024-01-26 RX ADMIN — CITALOPRAM HYDROBROMIDE 20 MG: 20 TABLET ORAL at 09:24

## 2024-01-26 RX ADMIN — ACETAMINOPHEN: 325 TABLET ORAL at 11:43

## 2024-01-26 RX ADMIN — LORAZEPAM 0.5 MG: 0.5 TABLET ORAL at 15:44

## 2024-01-26 NOTE — CM
" reviewed patient's chart and met with patient and spouse and sister in law today, patient's spouse would like patient to go to the [facility].  spoke with Georgette in admissions at the [facility] along with [facility] at [place] and they plan on coming out to the hospital to meet with patient, patient will need to be off med sitter for nursing home placement, nursing aware.  "~"Plan; Placement, patient needs to be off medsitter. "

## 2024-01-26 NOTE — W.PN.UPDATE
"Update Note"~"Progress Note Update"~"-: "~"Patient is still agitated frequently. He needs 1:1 as he keeps getting up and is a fall risk. Family is very helpful in supervising him."~"Interms of medications I could increase the Lamictal to 200 mg daily starting tomorrow. I would be reluctant to use Ativan regularly as it tends to impair cognition and increases risk for falling."

## 2024-01-26 NOTE — PN.CDI
"CDI"~"- -"~"CDI: "~"Physician Documentation Request"~"Admit Date: 01/19/24 09:01"~"Dear Doctor Alek, "~"Please review the following and provide your response in the progress notes. "~"Clinical Indicators: "~"H+P, 1/17"~"#A-fib paroxysmal"~"Cardiology, consult, 1/19"~"#...We are called with permanent AF that is slow."~"	"~"PN, 1/25"~"#Afib"~"   #...-not candidate for AC 2/2 falls "~"Please provide further specificity regarding atrial fibrillation, such as:"~"	Permanent atrial fibrillation - when a decision has been made to accept the presence of AF and there is no further attempt to restore or maintain sinus rhythm"~"	Paroxysmal atrial fibrillation - terminates spontaneously or with intervention within 7 days of onset"~"	Other - please specify"~"                 "~"Use of terms such as suspected, likely, concern for, or probable (associated with a specific diagnosis that is being evaluated, monitored, or treated as if it exists) are acceptable and can be coded in the inpatient setting, when documented at the "~"time of discharge. "~"Thank you, "~"Sarina Padilla RN BSN CCDS"~"CDI Specialist"~"please contact via tiger text"~"Please use your independent medical judgment in providing your response."

## 2024-01-27 VITALS — RESPIRATION RATE: 20 BRPM | SYSTOLIC BLOOD PRESSURE: 141 MMHG | DIASTOLIC BLOOD PRESSURE: 72 MMHG

## 2024-01-27 VITALS — SYSTOLIC BLOOD PRESSURE: 142 MMHG | DIASTOLIC BLOOD PRESSURE: 63 MMHG | RESPIRATION RATE: 20 BRPM

## 2024-01-27 RX ADMIN — ACETAMINOPHEN 650 MG: 325 TABLET ORAL at 03:53

## 2024-01-27 RX ADMIN — ACETAMINOPHEN 650 MG: 325 TABLET ORAL at 09:28

## 2024-01-27 RX ADMIN — CITALOPRAM HYDROBROMIDE 20 MG: 20 TABLET ORAL at 09:28

## 2024-01-27 RX ADMIN — CETIRIZINE HYDROCHLORIDE 5 MG: 10 TABLET ORAL at 03:53

## 2024-01-27 RX ADMIN — ACETAMINOPHEN: 325 TABLET ORAL at 12:48

## 2024-01-27 RX ADMIN — ANTIPRURITIC (ANTI-ITCH) 1 APPLIC: 1 CREAM TOPICAL at 22:02

## 2024-01-27 RX ADMIN — ACETAMINOPHEN 650 MG: 325 TABLET ORAL at 22:02

## 2024-01-27 RX ADMIN — TAMSULOSIN HYDROCHLORIDE 0.4 MG: 0.4 CAPSULE ORAL at 22:02

## 2024-01-27 RX ADMIN — ACETAMINOPHEN: 325 TABLET ORAL at 00:30

## 2024-01-27 RX ADMIN — WATER 2.1 ML: 1 INJECTION INTRAMUSCULAR; INTRAVENOUS; SUBCUTANEOUS at 02:34

## 2024-01-27 RX ADMIN — AMLODIPINE BESYLATE 5 MG: 5 TABLET ORAL at 09:28

## 2024-01-27 RX ADMIN — LAMOTRIGINE 200 MG: 100 TABLET ORAL at 09:28

## 2024-01-27 RX ADMIN — OLANZAPINE 5 MG: 5 TABLET, FILM COATED ORAL at 17:08

## 2024-01-27 RX ADMIN — OLANZAPINE 2.5 MG: 10 INJECTION, POWDER, LYOPHILIZED, FOR SOLUTION INTRAMUSCULAR at 02:33

## 2024-01-27 RX ADMIN — ASPIRIN 81 MG: 81 TABLET, COATED ORAL at 09:28

## 2024-01-27 RX ADMIN — ACETAMINOPHEN: 325 TABLET ORAL at 16:56

## 2024-01-27 RX ADMIN — ANTIPRURITIC (ANTI-ITCH) 1 APPLIC: 1 CREAM TOPICAL at 09:28

## 2024-01-27 RX ADMIN — LORAZEPAM 0.5 MG: 0.5 TABLET ORAL at 16:02

## 2024-01-27 RX ADMIN — LORAZEPAM 0.5 MG: 0.5 TABLET ORAL at 22:02

## 2024-01-27 RX ADMIN — Medication 5 MG: at 22:02

## 2024-01-27 RX ADMIN — ACETAMINOPHEN: 325 TABLET ORAL at 03:23

## 2024-01-27 NOTE — PTCARENOTE
Patient increasingly restless, making multiple, consecutive attempts to get OOB unassisted without using his call bell. Bed alarm in place. Patient redirected, made comfortable, and toileting offered. Ativan given.

## 2024-01-27 NOTE — PTCARENOTE
Patients visitors brought a bag of cookies for a snack. Patients visitor informed us that he would not stop eating cookies, so they were given to us in the nursing station.

## 2024-01-27 NOTE — CM
"Per update note, patient off of Medsitter as of 1/26/24 at 19:45. CM will update [facility] in [facility] as families first choice. CM will continue to follow for discharge planning needs."~"Plan; Placement pending accepting facility."

## 2024-01-28 VITALS — RESPIRATION RATE: 16 BRPM | DIASTOLIC BLOOD PRESSURE: 82 MMHG | SYSTOLIC BLOOD PRESSURE: 160 MMHG

## 2024-01-28 VITALS — RESPIRATION RATE: 20 BRPM | SYSTOLIC BLOOD PRESSURE: 144 MMHG | DIASTOLIC BLOOD PRESSURE: 69 MMHG

## 2024-01-28 VITALS — SYSTOLIC BLOOD PRESSURE: 167 MMHG | RESPIRATION RATE: 20 BRPM | DIASTOLIC BLOOD PRESSURE: 85 MMHG

## 2024-01-28 VITALS — DIASTOLIC BLOOD PRESSURE: 67 MMHG | RESPIRATION RATE: 18 BRPM | SYSTOLIC BLOOD PRESSURE: 147 MMHG

## 2024-01-28 RX ADMIN — ACETAMINOPHEN: 325 TABLET ORAL at 01:31

## 2024-01-28 RX ADMIN — ACETAMINOPHEN: 325 TABLET ORAL at 12:05

## 2024-01-28 RX ADMIN — AMLODIPINE BESYLATE 5 MG: 5 TABLET ORAL at 07:50

## 2024-01-28 RX ADMIN — ANTIPRURITIC (ANTI-ITCH) 1 APPLIC: 1 CREAM TOPICAL at 07:51

## 2024-01-28 RX ADMIN — ACETAMINOPHEN: 325 TABLET ORAL at 04:30

## 2024-01-28 RX ADMIN — OLANZAPINE 5 MG: 5 TABLET, FILM COATED ORAL at 17:25

## 2024-01-28 RX ADMIN — ACETAMINOPHEN 650 MG: 325 TABLET ORAL at 07:50

## 2024-01-28 RX ADMIN — LAMOTRIGINE 200 MG: 100 TABLET ORAL at 07:50

## 2024-01-28 RX ADMIN — CITALOPRAM HYDROBROMIDE 20 MG: 20 TABLET ORAL at 07:50

## 2024-01-28 RX ADMIN — ACETAMINOPHEN 650 MG: 325 TABLET ORAL at 17:25

## 2024-01-28 RX ADMIN — ASPIRIN 81 MG: 81 TABLET, COATED ORAL at 07:50

## 2024-01-29 VITALS — SYSTOLIC BLOOD PRESSURE: 118 MMHG | RESPIRATION RATE: 18 BRPM | DIASTOLIC BLOOD PRESSURE: 70 MMHG

## 2024-01-29 VITALS — DIASTOLIC BLOOD PRESSURE: 87 MMHG | SYSTOLIC BLOOD PRESSURE: 144 MMHG | RESPIRATION RATE: 16 BRPM

## 2024-01-29 VITALS — SYSTOLIC BLOOD PRESSURE: 118 MMHG | RESPIRATION RATE: 16 BRPM | DIASTOLIC BLOOD PRESSURE: 65 MMHG

## 2024-01-29 RX ADMIN — LAMOTRIGINE 200 MG: 100 TABLET ORAL at 07:41

## 2024-01-29 RX ADMIN — ACETAMINOPHEN: 325 TABLET ORAL at 05:06

## 2024-01-29 RX ADMIN — ACETAMINOPHEN: 325 TABLET ORAL at 12:00

## 2024-01-29 RX ADMIN — TAMSULOSIN HYDROCHLORIDE 0.4 MG: 0.4 CAPSULE ORAL at 22:05

## 2024-01-29 RX ADMIN — ASPIRIN 81 MG: 81 TABLET, COATED ORAL at 07:41

## 2024-01-29 RX ADMIN — ANTIPRURITIC (ANTI-ITCH) 1 APPLIC: 1 CREAM TOPICAL at 01:14

## 2024-01-29 RX ADMIN — ANTIPRURITIC (ANTI-ITCH) 1 APPLIC: 1 CREAM TOPICAL at 07:43

## 2024-01-29 RX ADMIN — ACETAMINOPHEN 650 MG: 325 TABLET ORAL at 01:15

## 2024-01-29 RX ADMIN — ACETAMINOPHEN: 325 TABLET ORAL at 04:50

## 2024-01-29 RX ADMIN — OLANZAPINE 5 MG: 5 TABLET, FILM COATED ORAL at 17:22

## 2024-01-29 RX ADMIN — TAMSULOSIN HYDROCHLORIDE 0.4 MG: 0.4 CAPSULE ORAL at 01:15

## 2024-01-29 RX ADMIN — ACETAMINOPHEN 650 MG: 325 TABLET ORAL at 07:41

## 2024-01-29 RX ADMIN — ACETAMINOPHEN 650 MG: 325 TABLET ORAL at 22:05

## 2024-01-29 RX ADMIN — LORAZEPAM 0.5 MG: 0.5 TABLET ORAL at 20:20

## 2024-01-29 RX ADMIN — Medication 5 MG: at 01:16

## 2024-01-29 RX ADMIN — ANTIPRURITIC (ANTI-ITCH) 1 APPLIC: 1 CREAM TOPICAL at 20:20

## 2024-01-29 RX ADMIN — ACETAMINOPHEN: 325 TABLET ORAL at 01:15

## 2024-01-29 RX ADMIN — CITALOPRAM HYDROBROMIDE 20 MG: 20 TABLET ORAL at 07:41

## 2024-01-29 RX ADMIN — AMLODIPINE BESYLATE 5 MG: 5 TABLET ORAL at 07:41

## 2024-01-29 RX ADMIN — ACETAMINOPHEN 650 MG: 325 TABLET ORAL at 17:22

## 2024-01-29 RX ADMIN — Medication 5 MG: at 22:05

## 2024-01-29 NOTE — W.PN.UPDATE
"Update Note"~"Progress Note Update"~"-: "~"Pt seen, reviewed with nursing staff.  Pt in jeffrey-chair, sleeping/resting quietly in the nurse's station.  Staff reports pt has been manageable with close supervision and reassurance.  No apparent side effects on current psychotropic medications.  "~"Pt on routine Zyprexa, has not required prn dose since overnight 1/26 to 1/27."~"Imp:  advanced Dementia, with intermittent agitation, improved on medication adjustments"~"Rec:  continue current psych med mgt"~"      pt psychiatrically stable for discharge to SNF"

## 2024-01-30 VITALS — DIASTOLIC BLOOD PRESSURE: 97 MMHG | SYSTOLIC BLOOD PRESSURE: 144 MMHG | RESPIRATION RATE: 18 BRPM

## 2024-01-30 VITALS — RESPIRATION RATE: 20 BRPM | DIASTOLIC BLOOD PRESSURE: 82 MMHG | SYSTOLIC BLOOD PRESSURE: 139 MMHG

## 2024-01-30 VITALS — RESPIRATION RATE: 18 BRPM | SYSTOLIC BLOOD PRESSURE: 115 MMHG | DIASTOLIC BLOOD PRESSURE: 59 MMHG

## 2024-01-30 LAB
ALBUMIN SERPL-MCNC: 3.2 G/DL (ref 3.5–5)
ALP SERPL-CCNC: 60 U/L (ref 38–126)
ALT SERPL-CCNC: 62 U/L (ref 0–50)
AST SERPL-CCNC: 51 U/L (ref 17–59)
BUN SERPL-MCNC: 19 MG/DL (ref 9–20)
CALCIUM SERPL-MCNC: 8.8 MG/DL (ref 8.4–10.2)
CHLORIDE SERPL-SCNC: 104 MMOL/L (ref 98–107)
CO2 SERPL-SCNC: 32 MMOL/L (ref 22–30)
EGFR: > 60
ERYTHROCYTE [DISTWIDTH] IN BLOOD BY AUTOMATED COUNT: 15.8 % (ref 11.5–14.5)
ESTIMATED CREATININE CLEARANCE: 77 ML/MIN
GLUCOSE SERPL-MCNC: 83 MG/DL (ref 70–99)
HCT VFR BLD AUTO: 33.9 % (ref 39–52)
HGB BLD-MCNC: 11.5 G/DL (ref 13–18)
MAGNESIUM SERPL-MCNC: 2.2 MG/DL (ref 1.6–2.3)
MCHC RBC AUTO-ENTMCNC: 33.9 G/DL (ref 33–37)
MCV RBC AUTO: 97.4 FL (ref 80–94)
PLATELET # BLD AUTO: 155 10^3/UL (ref 130–400)
POTASSIUM SERPL-SCNC: 4.1 MMOL/L (ref 3.5–5.1)
PROT SERPL-MCNC: 5.6 G/DL (ref 6.3–8.2)
SODIUM SERPL-SCNC: 136 MMOL/L (ref 135–145)

## 2024-01-30 RX ADMIN — LAMOTRIGINE 200 MG: 100 TABLET ORAL at 07:57

## 2024-01-30 RX ADMIN — ACETAMINOPHEN: 325 TABLET ORAL at 01:02

## 2024-01-30 RX ADMIN — ACETAMINOPHEN: 325 TABLET ORAL at 12:19

## 2024-01-30 RX ADMIN — Medication 5 MG: at 21:49

## 2024-01-30 RX ADMIN — ACETAMINOPHEN 650 MG: 325 TABLET ORAL at 07:57

## 2024-01-30 RX ADMIN — ANTIPRURITIC (ANTI-ITCH) 1 APPLIC: 1 CREAM TOPICAL at 19:36

## 2024-01-30 RX ADMIN — ASPIRIN 81 MG: 81 TABLET, COATED ORAL at 07:57

## 2024-01-30 RX ADMIN — OLANZAPINE 5 MG: 5 TABLET, FILM COATED ORAL at 17:20

## 2024-01-30 RX ADMIN — LORAZEPAM 0.5 MG: 0.5 TABLET ORAL at 14:35

## 2024-01-30 RX ADMIN — ACETAMINOPHEN: 325 TABLET ORAL at 23:53

## 2024-01-30 RX ADMIN — TAMSULOSIN HYDROCHLORIDE 0.4 MG: 0.4 CAPSULE ORAL at 21:49

## 2024-01-30 RX ADMIN — ACETAMINOPHEN: 325 TABLET ORAL at 05:00

## 2024-01-30 RX ADMIN — ANTIPRURITIC (ANTI-ITCH) 1 APPLIC: 1 CREAM TOPICAL at 08:02

## 2024-01-30 RX ADMIN — AMLODIPINE BESYLATE 5 MG: 5 TABLET ORAL at 08:01

## 2024-01-30 RX ADMIN — ACETAMINOPHEN: 325 TABLET ORAL at 15:26

## 2024-01-30 RX ADMIN — CITALOPRAM HYDROBROMIDE 20 MG: 20 TABLET ORAL at 07:58

## 2024-01-30 RX ADMIN — ACETAMINOPHEN 650 MG: 325 TABLET ORAL at 19:37

## 2024-01-30 NOTE — CM
"Level 2 assessment initiated and all paperwork completed and faxed to [facility]. Family are looking at long term placement, despite patient having been accepted at [facility] and [place] assisted livings/personal care facilities."~"Plan; Skilled placement, with long term care option needs Level 2 assessment. "

## 2024-01-31 VITALS — DIASTOLIC BLOOD PRESSURE: 67 MMHG | SYSTOLIC BLOOD PRESSURE: 129 MMHG | RESPIRATION RATE: 16 BRPM

## 2024-01-31 VITALS — DIASTOLIC BLOOD PRESSURE: 65 MMHG | RESPIRATION RATE: 16 BRPM | SYSTOLIC BLOOD PRESSURE: 121 MMHG

## 2024-01-31 VITALS — SYSTOLIC BLOOD PRESSURE: 134 MMHG | DIASTOLIC BLOOD PRESSURE: 116 MMHG | RESPIRATION RATE: 16 BRPM

## 2024-01-31 RX ADMIN — AMLODIPINE BESYLATE 5 MG: 5 TABLET ORAL at 10:46

## 2024-01-31 RX ADMIN — LAMOTRIGINE 200 MG: 100 TABLET ORAL at 10:45

## 2024-01-31 RX ADMIN — ACETAMINOPHEN: 325 TABLET ORAL at 15:12

## 2024-01-31 RX ADMIN — ANTIPRURITIC (ANTI-ITCH) 1 APPLIC: 1 CREAM TOPICAL at 08:21

## 2024-01-31 RX ADMIN — CITALOPRAM HYDROBROMIDE: 20 TABLET ORAL at 08:48

## 2024-01-31 RX ADMIN — ACETAMINOPHEN: 325 TABLET ORAL at 23:45

## 2024-01-31 RX ADMIN — LORAZEPAM 0.5 MG: 0.5 TABLET ORAL at 16:54

## 2024-01-31 RX ADMIN — ASPIRIN 81 MG: 81 TABLET, COATED ORAL at 10:46

## 2024-01-31 RX ADMIN — ACETAMINOPHEN: 325 TABLET ORAL at 11:06

## 2024-01-31 RX ADMIN — LORAZEPAM 0.5 MG: 0.5 TABLET ORAL at 10:40

## 2024-01-31 RX ADMIN — AMLODIPINE BESYLATE: 5 TABLET ORAL at 08:12

## 2024-01-31 RX ADMIN — AMLODIPINE BESYLATE: 5 TABLET ORAL at 08:48

## 2024-01-31 RX ADMIN — LAMOTRIGINE: 100 TABLET ORAL at 08:12

## 2024-01-31 RX ADMIN — CITALOPRAM HYDROBROMIDE: 20 TABLET ORAL at 08:21

## 2024-01-31 RX ADMIN — TAMSULOSIN HYDROCHLORIDE 0.4 MG: 0.4 CAPSULE ORAL at 21:04

## 2024-01-31 RX ADMIN — Medication 5 MG: at 21:04

## 2024-01-31 RX ADMIN — ASPIRIN: 81 TABLET, COATED ORAL at 08:21

## 2024-01-31 RX ADMIN — LAMOTRIGINE: 100 TABLET ORAL at 08:48

## 2024-01-31 RX ADMIN — OLANZAPINE 5 MG: 5 TABLET, FILM COATED ORAL at 17:03

## 2024-01-31 RX ADMIN — CITALOPRAM HYDROBROMIDE 20 MG: 20 TABLET ORAL at 10:46

## 2024-01-31 RX ADMIN — ASPIRIN: 81 TABLET, COATED ORAL at 08:48

## 2024-01-31 RX ADMIN — ACETAMINOPHEN: 325 TABLET ORAL at 03:50

## 2024-01-31 RX ADMIN — ACETAMINOPHEN 650 MG: 325 TABLET ORAL at 20:17

## 2024-01-31 RX ADMIN — ANTIPRURITIC (ANTI-ITCH) 1 APPLIC: 1 CREAM TOPICAL at 20:17

## 2024-01-31 RX ADMIN — ACETAMINOPHEN: 325 TABLET ORAL at 08:46

## 2024-01-31 NOTE — CM
"Addendum entered by Sandee Nguyen  01/31/24 16:07: "~"Kit from [facility] here to evaluate patient. "~"Await determination. "~"Will continue skilled bed search. "~"Patient with medicare primary. "~"Original Note:"~"TC from [facility] "~"[facility] requested H+P and correction to MA51 form. "~"Per [facility] patient may not require a level 2. "~"Await TCB. "~"Plan: Skilled rehab once cleared by [facility] and bed available. "

## 2024-01-31 NOTE — W.PN.UPDATE
"Addendum entered and electronically signed by Isacc Dunne MD  01/31/24 12:50: "~"note family asked a number of ? re meds, placement which i did my best to answer. they report they are happy that he seemed calm and comfortable today. they also told me some of the background hx of mr sprague's life. "~"Original Note:"~"Update Note"~"Progress Note Update"~"-: "~"patient seen chart reviewed.  discussed w nursing.  family at bedside who seemed very kind and concerned about mr castle. the patient was calm and appeared comfortable.nursing tells me he is not always cooperative eg w taking meds but he is no "~"longer agitated and combative. i also spoke w stephanie who report he was accepted by [facility] but patient want a higher level of care . would not make any changes in his current psych meds. he has used occasional ativan prn. has not required im zyprexa "~"in past several days. psych will check in every couple of days.  "

## 2024-02-01 VITALS — DIASTOLIC BLOOD PRESSURE: 61 MMHG | SYSTOLIC BLOOD PRESSURE: 129 MMHG | RESPIRATION RATE: 16 BRPM

## 2024-02-01 VITALS — RESPIRATION RATE: 16 BRPM | DIASTOLIC BLOOD PRESSURE: 60 MMHG | SYSTOLIC BLOOD PRESSURE: 139 MMHG

## 2024-02-01 VITALS — DIASTOLIC BLOOD PRESSURE: 65 MMHG | SYSTOLIC BLOOD PRESSURE: 139 MMHG | RESPIRATION RATE: 14 BRPM

## 2024-02-01 RX ADMIN — ACETAMINOPHEN 650 MG: 325 TABLET ORAL at 08:13

## 2024-02-01 RX ADMIN — ASPIRIN 81 MG: 81 TABLET, COATED ORAL at 08:14

## 2024-02-01 RX ADMIN — ANTIPRURITIC (ANTI-ITCH) 1 APPLIC: 1 CREAM TOPICAL at 20:07

## 2024-02-01 RX ADMIN — ACETAMINOPHEN 650 MG: 325 TABLET ORAL at 20:06

## 2024-02-01 RX ADMIN — CITALOPRAM HYDROBROMIDE 20 MG: 20 TABLET ORAL at 08:12

## 2024-02-01 RX ADMIN — LAMOTRIGINE 200 MG: 100 TABLET ORAL at 08:14

## 2024-02-01 RX ADMIN — ANTIPRURITIC (ANTI-ITCH) 1 APPLIC: 1 CREAM TOPICAL at 08:15

## 2024-02-01 RX ADMIN — ACETAMINOPHEN 650 MG: 325 TABLET ORAL at 12:46

## 2024-02-01 RX ADMIN — ACETAMINOPHEN 650 MG: 325 TABLET ORAL at 16:58

## 2024-02-01 RX ADMIN — LORAZEPAM 0.5 MG: 0.5 TABLET ORAL at 13:49

## 2024-02-01 RX ADMIN — OLANZAPINE 5 MG: 5 TABLET, FILM COATED ORAL at 17:01

## 2024-02-01 RX ADMIN — Medication 5 MG: at 21:38

## 2024-02-01 RX ADMIN — AMLODIPINE BESYLATE 5 MG: 5 TABLET ORAL at 08:13

## 2024-02-01 RX ADMIN — ACETAMINOPHEN: 325 TABLET ORAL at 03:04

## 2024-02-01 RX ADMIN — TAMSULOSIN HYDROCHLORIDE 0.4 MG: 0.4 CAPSULE ORAL at 21:38

## 2024-02-01 NOTE — W.PN.UPDATE
"Update Note"~"Progress Note Update"~"-: "~"patient seen chart reviewed.  discussed w nursing. mrs sprague at bedside.  mr sprague has had a few moments of agitation requiring a prn but overall he is better. he was smiling when i saw him and actually asked his wife a relevant clearly "~"articulated question 'are you staying with me here today?'  they were sitting together her feet up on his chair his feet on hers with him holding on to her foot.  it was truly a touching scene. mrs sprague does feel that her  is improving "~"although he remains of course confused.  i discussed w nursing whether we should make the ativan shirley but he felt it would be better to have it available when he needs it and hopefully avoid sedating him unnecessarily.  will continue to follow"

## 2024-02-01 NOTE — CM
"Await state/[place]ounty fax. "~"[facility] in for level 2 eval yesterday."~"PT/OT recommending skilled rehab. "~"Patient will require skilled rehab, medicare primary."~"Spoke with daughter Jory by phone late yesterday she would like skilled rehab with probable LTC, she will reach out to [facility] again.  "~"Per nursing patient calm, sitting in chair. "~"Multiple referrals placed. "~"Will continue bed search. "~"Plan: skilled rehab once bed available. "

## 2024-02-01 NOTE — PTCARENOTE
"Pt with frequent attempts to stand. Assisted to bathroom. Pt (+) large soft formed bowel movement and voided emperatriz urine. Assisted back to chair. Pt still with frequent attempts to stand. Hypervigilant; sensitive to stimuli. Quickly snapping head to "~"sounds or lights or touch. Furrowed brow. PRN Ativan 0.5mg PO administered for agitation.   "

## 2024-02-02 VITALS — RESPIRATION RATE: 17 BRPM | DIASTOLIC BLOOD PRESSURE: 71 MMHG | SYSTOLIC BLOOD PRESSURE: 129 MMHG

## 2024-02-02 VITALS — SYSTOLIC BLOOD PRESSURE: 174 MMHG | RESPIRATION RATE: 18 BRPM | DIASTOLIC BLOOD PRESSURE: 87 MMHG

## 2024-02-02 RX ADMIN — OLANZAPINE 5 MG: 5 TABLET, FILM COATED ORAL at 17:50

## 2024-02-02 RX ADMIN — ANTIPRURITIC (ANTI-ITCH) 1 APPLIC: 1 CREAM TOPICAL at 20:13

## 2024-02-02 RX ADMIN — LAMOTRIGINE 200 MG: 100 TABLET ORAL at 09:03

## 2024-02-02 RX ADMIN — ACETAMINOPHEN 650 MG: 325 TABLET ORAL at 09:03

## 2024-02-02 RX ADMIN — ANTIPRURITIC (ANTI-ITCH) 1 APPLIC: 1 CREAM TOPICAL at 09:04

## 2024-02-02 RX ADMIN — CITALOPRAM HYDROBROMIDE 20 MG: 20 TABLET ORAL at 09:03

## 2024-02-02 RX ADMIN — ACETAMINOPHEN: 325 TABLET ORAL at 04:12

## 2024-02-02 RX ADMIN — ACETAMINOPHEN 650 MG: 325 TABLET ORAL at 16:15

## 2024-02-02 RX ADMIN — ACETAMINOPHEN: 325 TABLET ORAL at 00:06

## 2024-02-02 RX ADMIN — ACETAMINOPHEN 650 MG: 325 TABLET ORAL at 20:07

## 2024-02-02 RX ADMIN — ACETAMINOPHEN 650 MG: 325 TABLET ORAL at 12:27

## 2024-02-02 RX ADMIN — AMLODIPINE BESYLATE 5 MG: 5 TABLET ORAL at 09:03

## 2024-02-02 RX ADMIN — OLANZAPINE 2.5 MG: 10 INJECTION, POWDER, LYOPHILIZED, FOR SOLUTION INTRAMUSCULAR at 03:55

## 2024-02-02 RX ADMIN — LORAZEPAM 0.25 MG: 0.5 TABLET ORAL at 20:08

## 2024-02-02 RX ADMIN — LORAZEPAM 0.5 MG: 0.5 TABLET ORAL at 16:15

## 2024-02-02 RX ADMIN — ASPIRIN 81 MG: 81 TABLET, COATED ORAL at 09:03

## 2024-02-02 RX ADMIN — TAMSULOSIN HYDROCHLORIDE 0.4 MG: 0.4 CAPSULE ORAL at 21:05

## 2024-02-02 RX ADMIN — Medication 5 MG: at 21:05

## 2024-02-02 RX ADMIN — LORAZEPAM 0.5 MG: 0.5 TABLET ORAL at 00:43

## 2024-02-02 NOTE — CM
"Level 2 assessment was completed and paperwork submitted, waiting on letter of determination. Patient cannot be on restraints for skilled placement. Reviewed with nursing. Plan will be skilled placement for patient."~"Plan; Skilled placement when Level 2 determination received."

## 2024-02-02 NOTE — W.PN.UPDATE
"Update Note"~"Progress Note Update"~"-: "~"patient seen chart reviewed. discussed w nursing. the patient was dozing in the hallway in a recliner. he has received prn twice over the past day..once ativan as well as im zyprexa.  noted he gets up from chair but the type of agitation for which "~"zyprexa used is not noted in chart.  i considered yesterday and today leaving a shirley dose of ativan and given that he had two prns one of them an im which i would lke to avoid i am going to order a scheduled small dose of ativan to see if im's can be "~"avoided.  will check in tomorrow to reasses. "

## 2024-02-03 VITALS — SYSTOLIC BLOOD PRESSURE: 149 MMHG | DIASTOLIC BLOOD PRESSURE: 85 MMHG | RESPIRATION RATE: 18 BRPM

## 2024-02-03 VITALS — RESPIRATION RATE: 18 BRPM | DIASTOLIC BLOOD PRESSURE: 66 MMHG | SYSTOLIC BLOOD PRESSURE: 151 MMHG

## 2024-02-03 VITALS — SYSTOLIC BLOOD PRESSURE: 151 MMHG | DIASTOLIC BLOOD PRESSURE: 79 MMHG | RESPIRATION RATE: 18 BRPM

## 2024-02-03 RX ADMIN — ACETAMINOPHEN 650 MG: 325 TABLET ORAL at 16:25

## 2024-02-03 RX ADMIN — ACETAMINOPHEN: 325 TABLET ORAL at 05:11

## 2024-02-03 RX ADMIN — LORAZEPAM 0.5 MG: 0.5 TABLET ORAL at 19:55

## 2024-02-03 RX ADMIN — ACETAMINOPHEN 650 MG: 325 TABLET ORAL at 11:57

## 2024-02-03 RX ADMIN — ANTIPRURITIC (ANTI-ITCH) 1 APPLIC: 1 CREAM TOPICAL at 08:49

## 2024-02-03 RX ADMIN — TAMSULOSIN HYDROCHLORIDE 0.4 MG: 0.4 CAPSULE ORAL at 22:05

## 2024-02-03 RX ADMIN — ACETAMINOPHEN 650 MG: 325 TABLET ORAL at 19:55

## 2024-02-03 RX ADMIN — ACETAMINOPHEN 650 MG: 325 TABLET ORAL at 08:45

## 2024-02-03 RX ADMIN — LORAZEPAM 0.25 MG: 0.5 TABLET ORAL at 08:45

## 2024-02-03 RX ADMIN — CITALOPRAM HYDROBROMIDE 20 MG: 20 TABLET ORAL at 08:44

## 2024-02-03 RX ADMIN — LAMOTRIGINE 200 MG: 100 TABLET ORAL at 08:44

## 2024-02-03 RX ADMIN — Medication 5 MG: at 22:05

## 2024-02-03 RX ADMIN — ASPIRIN 81 MG: 81 TABLET, COATED ORAL at 08:44

## 2024-02-03 RX ADMIN — OLANZAPINE 5 MG: 5 TABLET, FILM COATED ORAL at 17:45

## 2024-02-03 RX ADMIN — AMLODIPINE BESYLATE 5 MG: 5 TABLET ORAL at 08:44

## 2024-02-03 RX ADMIN — ANTIPRURITIC (ANTI-ITCH) 1 APPLIC: 1 CREAM TOPICAL at 19:56

## 2024-02-03 RX ADMIN — LORAZEPAM 0.5 MG: 0.5 TABLET ORAL at 01:56

## 2024-02-03 RX ADMIN — ACETAMINOPHEN: 325 TABLET ORAL at 01:07

## 2024-02-03 NOTE — W.PN.UPDATE
"Update Note"~"Progress Note Update"~"-: "~"patient seen chart reviewed.  mr sprague continues w periods of agitation. have increase the ativan to o.5 mg bid and will reassess tomorrow whether this is smoothing out agitation during the daytime hours.  i can see little rhyme or reason to how "~"his days go. a couple of days ago sitting w his wife he was the best i have seen him then he was struggling again ....discussed w  whether he might benefit from a psychiatric (jeffrey psych ) stay.  not clear to me that they would accept him "~"given how much care he needs.  the next stop might be an am dose of zyprexa perhaps.  will continue to follow"

## 2024-02-04 VITALS — DIASTOLIC BLOOD PRESSURE: 78 MMHG | RESPIRATION RATE: 18 BRPM | SYSTOLIC BLOOD PRESSURE: 174 MMHG

## 2024-02-04 RX ADMIN — ACETAMINOPHEN: 325 TABLET ORAL at 05:19

## 2024-02-04 RX ADMIN — LORAZEPAM 0.5 MG: 0.5 TABLET ORAL at 18:01

## 2024-02-04 RX ADMIN — LORAZEPAM 0.5 MG: 0.5 TABLET ORAL at 23:30

## 2024-02-04 RX ADMIN — LORAZEPAM 0.5 MG: 0.5 TABLET ORAL at 02:36

## 2024-02-04 RX ADMIN — ANTIPRURITIC (ANTI-ITCH) 1 APPLIC: 1 CREAM TOPICAL at 20:12

## 2024-02-04 RX ADMIN — AMLODIPINE BESYLATE 5 MG: 5 TABLET ORAL at 08:54

## 2024-02-04 RX ADMIN — ACETAMINOPHEN 650 MG: 325 TABLET ORAL at 00:45

## 2024-02-04 RX ADMIN — OLANZAPINE 5 MG: 5 TABLET, FILM COATED ORAL at 17:25

## 2024-02-04 RX ADMIN — ANTIPRURITIC (ANTI-ITCH) 1 APPLIC: 1 CREAM TOPICAL at 08:54

## 2024-02-04 RX ADMIN — ACETAMINOPHEN 650 MG: 325 TABLET ORAL at 23:30

## 2024-02-04 RX ADMIN — ACETAMINOPHEN: 325 TABLET ORAL at 16:00

## 2024-02-04 RX ADMIN — ASPIRIN 81 MG: 81 TABLET, COATED ORAL at 08:53

## 2024-02-04 RX ADMIN — LORAZEPAM 0.5 MG: 0.5 TABLET ORAL at 08:54

## 2024-02-04 RX ADMIN — LAMOTRIGINE 200 MG: 100 TABLET ORAL at 08:50

## 2024-02-04 RX ADMIN — ACETAMINOPHEN 650 MG: 325 TABLET ORAL at 12:25

## 2024-02-04 RX ADMIN — Medication 5 MG: at 20:12

## 2024-02-04 RX ADMIN — TAMSULOSIN HYDROCHLORIDE 0.4 MG: 0.4 CAPSULE ORAL at 20:12

## 2024-02-04 RX ADMIN — ACETAMINOPHEN 650 MG: 325 TABLET ORAL at 20:12

## 2024-02-04 RX ADMIN — LORAZEPAM 0.5 MG: 0.5 TABLET ORAL at 20:12

## 2024-02-04 RX ADMIN — CITALOPRAM HYDROBROMIDE 20 MG: 20 TABLET ORAL at 08:54

## 2024-02-04 RX ADMIN — ACETAMINOPHEN 650 MG: 325 TABLET ORAL at 08:53

## 2024-02-04 NOTE — W.PN.UPDATE
"Update Note"~"Progress Note Update"~"-: "~"patient seen chart reviewed. spoke w nursing and patient's aide.  he is doing better this am so far. nursing feels the shirley ativan dose is helping. nursing describes that he is cooperative with eating toileting. the issues arise when he tries to get "~"up. he does not remember to ask for help and that is not going to change given dementia.  nursing also suggests he may have reza. she reports snoring which is very very loud during the day ( which i can attest to) and stoppages of respiration. not "~"clear what we would do even w a sleep study which would be hard to get not to mention eliciting cooperation w the therapy for reza.  have dc'ed celexa. sometimes it can agitated patients. another possibility might be to increase lamictal in the "~"future. i did not do this today.  will continue to follow"

## 2024-02-04 NOTE — PTCARENOTE
"Patient incont of large amount of loose stool, is able to follow simple commands at times.  Out of bed to chair for breakfast  , gait is slow and slightly unsteady, did tolerate out of bed for about 1 hour then back to bed.   No respiratory distress "~"noted.  1:1 maintained for patient safety. "

## 2024-02-04 NOTE — CM
"Await level 2 determination. "~"Patient currently on 1:1. "~"Snf referrals (P). "~"Plan: skilled rehab when medically stable and bed available. "

## 2024-02-04 NOTE — PTCARENOTE
"Patient woke up from nap incontinent of large amount of urine, physically aggressive with staff when trying to change linens.  Removed all clothing, refuses to put on diaper or other incontinent products. Removed socks, insists on being naked.  "~"Received Prn ativan for agitation.  Takes meds crushed in applesauce.  "

## 2024-02-05 VITALS — SYSTOLIC BLOOD PRESSURE: 151 MMHG | DIASTOLIC BLOOD PRESSURE: 77 MMHG | RESPIRATION RATE: 16 BRPM

## 2024-02-05 VITALS — DIASTOLIC BLOOD PRESSURE: 73 MMHG | RESPIRATION RATE: 18 BRPM | SYSTOLIC BLOOD PRESSURE: 143 MMHG

## 2024-02-05 VITALS — RESPIRATION RATE: 18 BRPM | DIASTOLIC BLOOD PRESSURE: 80 MMHG | SYSTOLIC BLOOD PRESSURE: 134 MMHG

## 2024-02-05 RX ADMIN — LORAZEPAM 0.5 MG: 0.5 TABLET ORAL at 05:26

## 2024-02-05 RX ADMIN — LORAZEPAM 0.5 MG: 0.5 TABLET ORAL at 19:27

## 2024-02-05 RX ADMIN — LORAZEPAM 0.5 MG: 0.5 TABLET ORAL at 00:05

## 2024-02-05 RX ADMIN — ACETAMINOPHEN: 325 TABLET ORAL at 05:21

## 2024-02-05 RX ADMIN — LORAZEPAM 0.5 MG: 0.5 TABLET ORAL at 16:51

## 2024-02-05 RX ADMIN — CETIRIZINE HYDROCHLORIDE 10 MG: 10 TABLET ORAL at 21:22

## 2024-02-05 RX ADMIN — ANTIPRURITIC (ANTI-ITCH) 1 APPLIC: 1 CREAM TOPICAL at 08:00

## 2024-02-05 RX ADMIN — OLANZAPINE 5 MG: 5 TABLET, FILM COATED ORAL at 16:51

## 2024-02-05 RX ADMIN — Medication 5 MG: at 19:58

## 2024-02-05 RX ADMIN — ACETAMINOPHEN 650 MG: 325 TABLET ORAL at 15:36

## 2024-02-05 RX ADMIN — LORAZEPAM 0.5 MG: 0.5 TABLET ORAL at 08:02

## 2024-02-05 RX ADMIN — ACETAMINOPHEN: 325 TABLET ORAL at 12:09

## 2024-02-05 RX ADMIN — ACETAMINOPHEN 650 MG: 325 TABLET ORAL at 23:45

## 2024-02-05 RX ADMIN — LORAZEPAM 0.5 MG: 0.5 TABLET ORAL at 23:45

## 2024-02-05 RX ADMIN — ACETAMINOPHEN 650 MG: 325 TABLET ORAL at 19:27

## 2024-02-05 RX ADMIN — ASPIRIN 81 MG: 81 TABLET, COATED ORAL at 08:01

## 2024-02-05 RX ADMIN — ANTIPRURITIC (ANTI-ITCH) 1 APPLIC: 1 CREAM TOPICAL at 19:27

## 2024-02-05 RX ADMIN — ACETAMINOPHEN 650 MG: 325 TABLET ORAL at 08:02

## 2024-02-05 RX ADMIN — TAMSULOSIN HYDROCHLORIDE 0.4 MG: 0.4 CAPSULE ORAL at 19:58

## 2024-02-05 RX ADMIN — AMLODIPINE BESYLATE 5 MG: 5 TABLET ORAL at 08:01

## 2024-02-05 RX ADMIN — LAMOTRIGINE 200 MG: 100 TABLET ORAL at 08:01

## 2024-02-05 NOTE — W.PN.UPDATE
"Update Note"~"Progress Note Update"~"-: "~"Pt seen, reviewed with staff, fairly alert, sitting up, attempting to eat.  No agitation or aggressive behavior when seen this morning.  Pt reportedly has been sleepy this am, did not sleep well last night, was given Ativan 0.5 mg.  Pt continues to "~"be on Zyprexa 5 mg at 1800, Ativan 0.5 mg BID added on 2/3/24, Lamictal 200 mg daily.  Celexa trial was stopped after 15 days with no clear benefit and possibility of worsening agitation."~"Imp:  Dementia, advanced, with intermittent behavior disturbance/agitation.  Pt showing typical progression of dementia; behavior overall manageable"~"Rec:  Will continue current psychotropic medications; continue to follow peripherally.  Pt appears psychiatrically stable for discharge to SNF"

## 2024-02-05 NOTE — CM
"Patient remains on a 1:1"~"medications being adjusted. "~"Needs skilled placement. "~"Plan: Await skilled bed. "

## 2024-02-05 NOTE — PTCARENOTE
"This nurse attempted to give pt. PRN ativan d/t agitation and repeatedly attempting to get OOB while becoming increasingly uncooperative. Pt. spit medication and applesauce out all over himself. Provider notified and a one time dose was ordered in "~"order for pt. to receive medication. Med given whole, in ice cream; pt. successfully swallowed medication. VSS at this time, will continue to monitor."

## 2024-02-06 VITALS — DIASTOLIC BLOOD PRESSURE: 99 MMHG | SYSTOLIC BLOOD PRESSURE: 150 MMHG | RESPIRATION RATE: 20 BRPM

## 2024-02-06 VITALS — RESPIRATION RATE: 16 BRPM | DIASTOLIC BLOOD PRESSURE: 96 MMHG | SYSTOLIC BLOOD PRESSURE: 147 MMHG

## 2024-02-06 RX ADMIN — LORAZEPAM 0.5 MG: 0.5 TABLET ORAL at 09:30

## 2024-02-06 RX ADMIN — TAMSULOSIN HYDROCHLORIDE 0.4 MG: 0.4 CAPSULE ORAL at 21:29

## 2024-02-06 RX ADMIN — ACETAMINOPHEN: 325 TABLET ORAL at 04:01

## 2024-02-06 RX ADMIN — LAMOTRIGINE 200 MG: 100 TABLET ORAL at 09:30

## 2024-02-06 RX ADMIN — OLANZAPINE 5 MG: 5 TABLET, FILM COATED ORAL at 16:37

## 2024-02-06 RX ADMIN — ASPIRIN 81 MG: 81 TABLET, COATED ORAL at 09:31

## 2024-02-06 RX ADMIN — Medication 5 MG: at 21:29

## 2024-02-06 RX ADMIN — ACETAMINOPHEN 650 MG: 325 TABLET ORAL at 21:29

## 2024-02-06 RX ADMIN — LORAZEPAM 0.5 MG: 0.5 TABLET ORAL at 21:29

## 2024-02-06 RX ADMIN — ACETAMINOPHEN 650 MG: 325 TABLET ORAL at 09:29

## 2024-02-06 RX ADMIN — ANTIPRURITIC (ANTI-ITCH) 1 APPLIC: 1 CREAM TOPICAL at 09:30

## 2024-02-06 RX ADMIN — ACETAMINOPHEN 650 MG: 325 TABLET ORAL at 16:36

## 2024-02-06 RX ADMIN — AMLODIPINE BESYLATE 5 MG: 5 TABLET ORAL at 09:30

## 2024-02-06 RX ADMIN — LORAZEPAM 0.5 MG: 0.5 TABLET ORAL at 05:53

## 2024-02-06 RX ADMIN — ANTIPRURITIC (ANTI-ITCH) 1 APPLIC: 1 CREAM TOPICAL at 21:30

## 2024-02-06 RX ADMIN — ACETAMINOPHEN: 325 TABLET ORAL at 12:49

## 2024-02-06 NOTE — PTCARENOTE
"Late entry 2/5 1600 Pt with restless agitation, unable to redirect, voided on floor.  Received Ativan  Prn with some effect.  Rests for short periods and awake for short periods.  Easily agitated, need to anticipate needs.1:1 maintained for patient "~"safety."

## 2024-02-06 NOTE — PTCARENOTE
"Patient with varying behaviors throughout the day. Emotionally labile at times, goes from being agreeable to being frustrated.  Difficulty redirecting.  Smiled a few times today.  Attempts to urinate in bathroom but dribble as he walks.  Garbled "~"speech, which is his baseline.  Resting at present "

## 2024-02-07 VITALS — DIASTOLIC BLOOD PRESSURE: 71 MMHG | SYSTOLIC BLOOD PRESSURE: 136 MMHG | RESPIRATION RATE: 18 BRPM

## 2024-02-07 RX ADMIN — OLANZAPINE 7.5 MG: 5 TABLET, FILM COATED ORAL at 17:29

## 2024-02-07 RX ADMIN — ANTIPRURITIC (ANTI-ITCH) 1 APPLIC: 1 CREAM TOPICAL at 09:05

## 2024-02-07 RX ADMIN — LORAZEPAM 0.5 MG: 0.5 TABLET ORAL at 09:04

## 2024-02-07 RX ADMIN — ACETAMINOPHEN: 325 TABLET ORAL at 09:05

## 2024-02-07 RX ADMIN — ACETAMINOPHEN 650 MG: 325 TABLET ORAL at 00:04

## 2024-02-07 RX ADMIN — ASPIRIN 81 MG: 81 TABLET, COATED ORAL at 09:04

## 2024-02-07 RX ADMIN — AMLODIPINE BESYLATE 5 MG: 5 TABLET ORAL at 09:04

## 2024-02-07 RX ADMIN — TAMSULOSIN HYDROCHLORIDE 0.4 MG: 0.4 CAPSULE ORAL at 21:42

## 2024-02-07 RX ADMIN — LORAZEPAM 0.5 MG: 0.5 TABLET ORAL at 00:05

## 2024-02-07 RX ADMIN — ACETAMINOPHEN: 325 TABLET ORAL at 04:46

## 2024-02-07 RX ADMIN — Medication 5 MG: at 21:42

## 2024-02-07 RX ADMIN — ACETAMINOPHEN: 325 TABLET ORAL at 13:07

## 2024-02-07 RX ADMIN — ACETAMINOPHEN 650 MG: 325 TABLET ORAL at 17:29

## 2024-02-07 RX ADMIN — LORAZEPAM 0.5 MG: 0.5 TABLET ORAL at 19:45

## 2024-02-07 RX ADMIN — LAMOTRIGINE 200 MG: 100 TABLET ORAL at 09:04

## 2024-02-07 RX ADMIN — ACETAMINOPHEN 650 MG: 325 TABLET ORAL at 19:45

## 2024-02-07 RX ADMIN — ANTIPRURITIC (ANTI-ITCH): 1 CREAM TOPICAL at 22:16

## 2024-02-07 NOTE — CM
"Patient seen bedside. "~"Remains on 1:1. "~"Level 2 completed.  Forms on chart. "~"Skilled bed search continues. "~"additional referrals and calls completed. "~"Spoke with daughter by phone, updated on bed search. "~"Plan: skilled rehab placement vs geripsych"

## 2024-02-07 NOTE — W.PN.UPDATE
"Update Note"~"Progress Note Update"~"-: "~"patient seen chart reviewed. discussed with case mgt. the patient continues w periods of agitation.  at this moment he was pleasant as he devoured the food on his tray with one to one present. he offered no complaints.  discussed w cm whether we "~"might find him a [facility] psych bed for further stabilization of his periods of agitation which typically occur in the evening. have increase daily zyprexa dose to 7.5 mg"

## 2024-02-07 NOTE — CM
"Addendum entered by Rosalva Castaneda  02/07/24 14:48: "~"Patient has been denied at [facility], and denied at [facility] facilities. "~"Addendum entered by Rosalva Castaneda  02/07/24 11:12: "~"Referral also sent to [facility], [place], [phone] Fax [phone],  spoke with Meeta in admissions. "~"Original Note:"~" reviewed patient's chart and patient is still with behaviors/agitation and on 1:1, patient cannot be placed in a nursing home on 1:1, therefore  reached out to [facility] and spoke with Sosa in "~"admissions and referral faxed to [phone],  also reached out to [facility] and spoke with Jaswant in admissions and faxed a referral Fax [phone]."~"Plan; To follow up with above facilities for possible further treatment for behaviors/agitation."

## 2024-02-08 VITALS — RESPIRATION RATE: 18 BRPM | SYSTOLIC BLOOD PRESSURE: 145 MMHG | DIASTOLIC BLOOD PRESSURE: 74 MMHG

## 2024-02-08 VITALS — SYSTOLIC BLOOD PRESSURE: 119 MMHG | RESPIRATION RATE: 18 BRPM | DIASTOLIC BLOOD PRESSURE: 95 MMHG

## 2024-02-08 VITALS — DIASTOLIC BLOOD PRESSURE: 86 MMHG | SYSTOLIC BLOOD PRESSURE: 127 MMHG | HEART RATE: 92 BPM

## 2024-02-08 VITALS — SYSTOLIC BLOOD PRESSURE: 143 MMHG | RESPIRATION RATE: 20 BRPM | DIASTOLIC BLOOD PRESSURE: 74 MMHG

## 2024-02-08 RX ADMIN — QUETIAPINE 50 MG: 25 TABLET, FILM COATED ORAL at 20:15

## 2024-02-08 RX ADMIN — ACETAMINOPHEN: 325 TABLET ORAL at 11:28

## 2024-02-08 RX ADMIN — ACETAMINOPHEN 650 MG: 325 TABLET ORAL at 20:14

## 2024-02-08 RX ADMIN — ASPIRIN 81 MG: 81 TABLET, COATED ORAL at 09:06

## 2024-02-08 RX ADMIN — ACETAMINOPHEN 650 MG: 325 TABLET ORAL at 09:06

## 2024-02-08 RX ADMIN — LORAZEPAM 0.5 MG: 0.5 TABLET ORAL at 09:06

## 2024-02-08 RX ADMIN — LORAZEPAM 0.5 MG: 0.5 TABLET ORAL at 20:14

## 2024-02-08 RX ADMIN — ACETAMINOPHEN: 325 TABLET ORAL at 00:44

## 2024-02-08 RX ADMIN — LORAZEPAM 0.5 MG: 0.5 TABLET ORAL at 02:15

## 2024-02-08 RX ADMIN — TAMSULOSIN HYDROCHLORIDE 0.4 MG: 0.4 CAPSULE ORAL at 20:14

## 2024-02-08 RX ADMIN — ANTIPRURITIC (ANTI-ITCH) 1 APPLIC: 1 CREAM TOPICAL at 20:15

## 2024-02-08 RX ADMIN — ACETAMINOPHEN: 325 TABLET ORAL at 04:20

## 2024-02-08 RX ADMIN — Medication: at 22:57

## 2024-02-08 RX ADMIN — ANTIPRURITIC (ANTI-ITCH) 1 APPLIC: 1 CREAM TOPICAL at 09:06

## 2024-02-08 RX ADMIN — ACETAMINOPHEN: 325 TABLET ORAL at 16:30

## 2024-02-08 RX ADMIN — AMLODIPINE BESYLATE 5 MG: 5 TABLET ORAL at 09:07

## 2024-02-08 RX ADMIN — LORAZEPAM 0.5 MG: 0.5 TABLET ORAL at 12:12

## 2024-02-08 RX ADMIN — LAMOTRIGINE 200 MG: 100 TABLET ORAL at 09:06

## 2024-02-08 NOTE — CM
"Addendum entered by Sandee Nguyen  02/08/24 16:27: "~"TCB from [facility], patient does not seem to meet criteria for admission at this time and needs a disposition. "~"If anything changes they will re-review. "~"Addendum entered by Sandee Nguyen  02/08/24 12:09: "~"TCB from [facility] requesting additional information."~"Most recent EKG, CXR, labs and admission Psychiatry note sent for review. "~"Original Note:"~"Patient remains on 1:1."~"Per nursing OOB to bathroom with assistance."~"Medications adjusted yesterday. "~"Plan: Disposition efforts continue. "~"Unable to transfer to skilled rehab until patient of 1:1. "~"Referral to be sent to [facility] agreeable to review."

## 2024-02-08 NOTE — W.PN.UPDATE
"Update Note"~"Progress Note Update"~"-: "~"dr milian kindly offered us a curbside consult and has been texting dr mckeon and myself.  she has advised a bx and an eosinophil count.  she reviewed meds and feels anything could be causing rash . the bx is to r.o sj syndrome. have dc'ed "~"lamictal.  will hold zyprexa for tonight.  would consider switching the antipsychotic. called pharmacy ([facility]) to see if he had ever taken seroquel . they do not see any seroquel in his profile.  qtc is okay. will start with 50 mg seroquel this "~"evening.   "

## 2024-02-08 NOTE — CM
" spoke with Jaswant in admissions at [facility] and they have declined patient, at this time plan will be to stabilize patient, remove one to one supervision and place patient in a skilled facility."~"Plan;  Follow with patient and when patient is off one to one supervision proceed with skilled placement. "

## 2024-02-08 NOTE — W.PN.UPDATE
"Addendum entered and electronically signed by Isacc Dunne MD  02/08/24 13:26: "~"dr milian replied she is no longer on  staff. "~"Original Note:"~"Update Note"~"Progress Note Update"~"-: "~"patient seen chart reviewed. discussed w nursing and case mgt.  the patient continues to have periods of agitation. it is not possible to really get from him as he is so impaired cognitively what is going on with him. at this moment he is quietly "~"sleeping and he received ativan at midday. i did not he was scratching his arms and legs and the rash noted before seems to be worsening in terms of itching.  i don't think this is annamaria harrington ...he is on lamictal as mucous membranes don't seem "~"involved...he eats with no difficulty.  stopped lamictal any way and have tiger texted dr milian to see if she would be willing to come to  to see him or whether she can offer curbside consult. maynor texted dr mckeon re rash which actually "~"precedes his arrival at .  it really could be any of his meds or none. he was taking both lamictal and olanzapine at admission  asked nursing to do postural signs given that we increased the zyprexa yesterday and there are anticholinergic side "~"effects.  did not make other changes in meds today.  spoke w case mgt who continues to search for a bed in snf or hospital   "

## 2024-02-09 VITALS — DIASTOLIC BLOOD PRESSURE: 65 MMHG | RESPIRATION RATE: 17 BRPM | SYSTOLIC BLOOD PRESSURE: 93 MMHG

## 2024-02-09 VITALS — RESPIRATION RATE: 20 BRPM | SYSTOLIC BLOOD PRESSURE: 168 MMHG | DIASTOLIC BLOOD PRESSURE: 98 MMHG

## 2024-02-09 LAB
ALBUMIN SERPL-MCNC: 3.6 G/DL (ref 3.5–5)
ALP SERPL-CCNC: 55 U/L (ref 38–126)
ALT SERPL-CCNC: 31 U/L (ref 0–50)
AST SERPL-CCNC: 29 U/L (ref 17–59)
BUN SERPL-MCNC: 13 MG/DL (ref 9–20)
CALCIUM SERPL-MCNC: 9.6 MG/DL (ref 8.4–10.2)
CHLORIDE SERPL-SCNC: 100 MMOL/L (ref 98–107)
CO2 SERPL-SCNC: 31 MMOL/L (ref 22–30)
EGFR: > 60
ERYTHROCYTE [DISTWIDTH] IN BLOOD BY AUTOMATED COUNT: 14.7 % (ref 11.5–14.5)
ESTIMATED CREATININE CLEARANCE: 77 ML/MIN
GLUCOSE SERPL-MCNC: 105 MG/DL (ref 70–99)
HCT VFR BLD AUTO: 40.6 % (ref 39–52)
HGB BLD-MCNC: 13.5 G/DL (ref 13–18)
MCHC RBC AUTO-ENTMCNC: 33.3 G/DL (ref 33–37)
MCV RBC AUTO: 97.8 FL (ref 80–94)
NRBC BLD AUTO-RTO: 0 %
PLATELET # BLD AUTO: 132 10^3/UL (ref 130–400)
POTASSIUM SERPL-SCNC: 4 MMOL/L (ref 3.5–5.1)
PROT SERPL-MCNC: 6.2 G/DL (ref 6.3–8.2)
SODIUM SERPL-SCNC: 138 MMOL/L (ref 135–145)

## 2024-02-09 RX ADMIN — ASPIRIN 81 MG: 81 TABLET, COATED ORAL at 08:10

## 2024-02-09 RX ADMIN — Medication 5 MG: at 19:52

## 2024-02-09 RX ADMIN — LORAZEPAM 0.5 MG: 0.5 TABLET ORAL at 08:10

## 2024-02-09 RX ADMIN — ACETAMINOPHEN: 325 TABLET ORAL at 03:59

## 2024-02-09 RX ADMIN — ACETAMINOPHEN 650 MG: 325 TABLET ORAL at 19:52

## 2024-02-09 RX ADMIN — TAMSULOSIN HYDROCHLORIDE 0.4 MG: 0.4 CAPSULE ORAL at 19:52

## 2024-02-09 RX ADMIN — ACETAMINOPHEN 650 MG: 325 TABLET ORAL at 08:11

## 2024-02-09 RX ADMIN — ANTIPRURITIC (ANTI-ITCH) 1 APPLIC: 1 CREAM TOPICAL at 08:11

## 2024-02-09 RX ADMIN — ACETAMINOPHEN: 325 TABLET ORAL at 23:29

## 2024-02-09 RX ADMIN — LORAZEPAM 0.5 MG: 0.5 TABLET ORAL at 19:53

## 2024-02-09 RX ADMIN — ACETAMINOPHEN 650 MG: 325 TABLET ORAL at 17:53

## 2024-02-09 RX ADMIN — ANTIPRURITIC (ANTI-ITCH) 1 APPLIC: 1 CREAM TOPICAL at 19:55

## 2024-02-09 RX ADMIN — ACETAMINOPHEN 650 MG: 325 TABLET ORAL at 13:27

## 2024-02-09 RX ADMIN — QUETIAPINE 50 MG: 25 TABLET, FILM COATED ORAL at 17:53

## 2024-02-09 RX ADMIN — ACETAMINOPHEN: 325 TABLET ORAL at 00:34

## 2024-02-09 NOTE — W.PN.UPDATE
"Update Note"~"Progress Note Update"~"-: "~"patient seen chart reviewed. spoke with nursing. despite the dc of zyprexa and lamictal mr sprague happily does not seem any worse. he struggles when he wants to get up but you cannot reason with him.  another issue is when blood needs to be drawn, "~"vital signs need to be done etc and now a biopsy may be in the offing for rash. (the ointment dr milian suggested seems to be helpfin some)   it is very difficult to imagine that somehow his disposition will be resolved.  he would be a very very "~"difficult patient for a nursing home.will it be possible?  he really needs constant one to one.  should hospice be considered? he still has a voracious appetite and right now no severe medical illness .   should we stop drawing blood which upsets "~"him.  replacing iv's upsets him. , (we do need to deal with the rash to keep him comfortable), doing frequent vitals upsets him etc.   (he is here for falling and we do need to try to keep him from falling). perhaps this needs to be  broached  with "~"family.  for now would continue the seroquel which seems no worse than the prior regimen. qtc done in late january okay.  "

## 2024-02-09 NOTE — PTCARENOTE
PCT attempted to collect vital signs from patient at 1900. Patient was agitated. Blood pressure obtained; however, unable to obtain temperature or pulse ox. Reorientation provided to patient.

## 2024-02-09 NOTE — CM
"spoke with nurse.patient  remains  confused and is still on 1 to 1.nurse reports patient gets upset having his blood drawn,taking vital signs etc.patient now with skin rash-plan is for snf placement when off 1 to 1.cm to follow patient's progress."~"Plan is for dishcharge tp snf when appropriate."

## 2024-02-10 VITALS — DIASTOLIC BLOOD PRESSURE: 82 MMHG | RESPIRATION RATE: 14 BRPM | SYSTOLIC BLOOD PRESSURE: 125 MMHG

## 2024-02-10 VITALS — DIASTOLIC BLOOD PRESSURE: 62 MMHG | SYSTOLIC BLOOD PRESSURE: 140 MMHG | RESPIRATION RATE: 16 BRPM

## 2024-02-10 VITALS — DIASTOLIC BLOOD PRESSURE: 69 MMHG | SYSTOLIC BLOOD PRESSURE: 143 MMHG | RESPIRATION RATE: 16 BRPM

## 2024-02-10 RX ADMIN — LORAZEPAM 0.5 MG: 0.5 TABLET ORAL at 07:17

## 2024-02-10 RX ADMIN — TAMSULOSIN HYDROCHLORIDE 0.4 MG: 0.4 CAPSULE ORAL at 21:48

## 2024-02-10 RX ADMIN — ACETAMINOPHEN: 325 TABLET ORAL at 04:36

## 2024-02-10 RX ADMIN — ASPIRIN 81 MG: 81 TABLET, COATED ORAL at 07:17

## 2024-02-10 RX ADMIN — Medication 5 MG: at 21:49

## 2024-02-10 RX ADMIN — ANTIPRURITIC (ANTI-ITCH) 1 APPLIC: 1 CREAM TOPICAL at 21:40

## 2024-02-10 RX ADMIN — ACETAMINOPHEN 650 MG: 325 TABLET ORAL at 16:09

## 2024-02-10 RX ADMIN — ACETAMINOPHEN 650 MG: 325 TABLET ORAL at 21:44

## 2024-02-10 RX ADMIN — LORAZEPAM 0.5 MG: 0.5 TABLET ORAL at 21:39

## 2024-02-10 RX ADMIN — ANTIPRURITIC (ANTI-ITCH) 1 APPLIC: 1 CREAM TOPICAL at 07:17

## 2024-02-10 RX ADMIN — ACETAMINOPHEN 650 MG: 325 TABLET ORAL at 07:17

## 2024-02-10 RX ADMIN — ACETAMINOPHEN 650 MG: 325 TABLET ORAL at 11:03

## 2024-02-10 RX ADMIN — QUETIAPINE 50 MG: 25 TABLET, FILM COATED ORAL at 17:11

## 2024-02-10 NOTE — W.PN.UPDATE
"Update Note"~"Progress Note Update"~"-: "~"Pt seen & evaluated at bedside with 1 to1 present. Is calm and at times pleasant, however unable to participate in meaningful interview - responses often do not meaningfully relate to posed questions, often trails off mumbling or speaks too quietly "~"to be heard. Is not oriented. "~"Reviewed notes and spoke to staff, it seems that he has been a bit calmer since starting seroquel, however remains intermittently agitated and difficult to redirect, continues to require a 1-1 which precludes from dispo planning. "~"Increase seroquel to 25mg AM & afternoon + 50mg hs, maintain lowest dose needed for behavioral stability"

## 2024-02-11 VITALS — DIASTOLIC BLOOD PRESSURE: 69 MMHG | RESPIRATION RATE: 18 BRPM | SYSTOLIC BLOOD PRESSURE: 148 MMHG

## 2024-02-11 RX ADMIN — LORAZEPAM 0.5 MG: 0.5 TABLET ORAL at 20:15

## 2024-02-11 RX ADMIN — LORAZEPAM 0.5 MG: 0.5 TABLET ORAL at 07:27

## 2024-02-11 RX ADMIN — TAMSULOSIN HYDROCHLORIDE 0.4 MG: 0.4 CAPSULE ORAL at 21:24

## 2024-02-11 RX ADMIN — QUETIAPINE 25 MG: 25 TABLET, FILM COATED ORAL at 07:28

## 2024-02-11 RX ADMIN — QUETIAPINE 25 MG: 25 TABLET, FILM COATED ORAL at 12:17

## 2024-02-11 RX ADMIN — ACETAMINOPHEN 650 MG: 325 TABLET ORAL at 07:30

## 2024-02-11 RX ADMIN — ACETAMINOPHEN 650 MG: 325 TABLET ORAL at 20:15

## 2024-02-11 RX ADMIN — ANTIPRURITIC (ANTI-ITCH) 1 APPLIC: 1 CREAM TOPICAL at 20:15

## 2024-02-11 RX ADMIN — QUETIAPINE 50 MG: 25 TABLET, FILM COATED ORAL at 17:31

## 2024-02-11 RX ADMIN — Medication 5 MG: at 21:24

## 2024-02-11 RX ADMIN — LORAZEPAM 0.5 MG: 0.5 TABLET ORAL at 14:07

## 2024-02-11 RX ADMIN — ANTIPRURITIC (ANTI-ITCH) 1 APPLIC: 1 CREAM TOPICAL at 07:29

## 2024-02-11 RX ADMIN — ASPIRIN 81 MG: 81 TABLET, COATED ORAL at 07:26

## 2024-02-11 RX ADMIN — ACETAMINOPHEN 650 MG: 325 TABLET ORAL at 15:02

## 2024-02-11 RX ADMIN — ACETAMINOPHEN: 325 TABLET ORAL at 04:47

## 2024-02-11 RX ADMIN — ACETAMINOPHEN 650 MG: 325 TABLET ORAL at 11:15

## 2024-02-11 RX ADMIN — ACETAMINOPHEN: 325 TABLET ORAL at 00:28

## 2024-02-11 NOTE — PTCARENOTE
Patient knows when they have to urinate. Patient urinates all over the floors, sometimes makes to the toliet.

## 2024-02-11 NOTE — W.PN.UPDATE
"Update Note"~"Progress Note Update"~"-: "~"Pt seen & evaluated at bedside. Not answering my questions today, at times will look at me pensively but still will not respond. Is sitting calmly in chair with jacket on partially (one arm in sleeve only). Reviewed notes & spoke to nursing staff, "~"although still having times of unpredictable agitation, this has decreased and he has overall been calmer today."~"Increase seroquel slightly more to 50mg AM-25mg afternoon-50mg HS, monitor for sedation (none reported thus far), needs to be off 1-1 for dispo planning"

## 2024-02-11 NOTE — PTCARENOTE
Received pt from room 426. Pt transported by chair and PCT to maintain the 1:1 coverage. Resumed care.

## 2024-02-12 VITALS — DIASTOLIC BLOOD PRESSURE: 97 MMHG | RESPIRATION RATE: 18 BRPM | SYSTOLIC BLOOD PRESSURE: 147 MMHG

## 2024-02-12 VITALS — RESPIRATION RATE: 16 BRPM | DIASTOLIC BLOOD PRESSURE: 81 MMHG | SYSTOLIC BLOOD PRESSURE: 166 MMHG

## 2024-02-12 VITALS — SYSTOLIC BLOOD PRESSURE: 148 MMHG | DIASTOLIC BLOOD PRESSURE: 77 MMHG | RESPIRATION RATE: 18 BRPM

## 2024-02-12 VITALS — RESPIRATION RATE: 18 BRPM | DIASTOLIC BLOOD PRESSURE: 94 MMHG | SYSTOLIC BLOOD PRESSURE: 145 MMHG

## 2024-02-12 VITALS — SYSTOLIC BLOOD PRESSURE: 147 MMHG | DIASTOLIC BLOOD PRESSURE: 97 MMHG | RESPIRATION RATE: 18 BRPM

## 2024-02-12 RX ADMIN — ANTIPRURITIC (ANTI-ITCH) 1 APPLIC: 1 CREAM TOPICAL at 08:22

## 2024-02-12 RX ADMIN — QUETIAPINE 50 MG: 25 TABLET, FILM COATED ORAL at 08:22

## 2024-02-12 RX ADMIN — Medication 5 MG: at 21:59

## 2024-02-12 RX ADMIN — ACETAMINOPHEN: 325 TABLET ORAL at 00:13

## 2024-02-12 RX ADMIN — QUETIAPINE 50 MG: 25 TABLET, FILM COATED ORAL at 17:19

## 2024-02-12 RX ADMIN — ASPIRIN 81 MG: 81 TABLET, COATED ORAL at 08:22

## 2024-02-12 RX ADMIN — LORAZEPAM 0.5 MG: 0.5 TABLET ORAL at 08:22

## 2024-02-12 RX ADMIN — QUETIAPINE 25 MG: 25 TABLET, FILM COATED ORAL at 13:05

## 2024-02-12 RX ADMIN — ANTIPRURITIC (ANTI-ITCH) 1 APPLIC: 1 CREAM TOPICAL at 19:51

## 2024-02-12 RX ADMIN — LORAZEPAM 0.5 MG: 0.5 TABLET ORAL at 19:50

## 2024-02-12 RX ADMIN — ACETAMINOPHEN: 325 TABLET ORAL at 04:12

## 2024-02-12 RX ADMIN — LORAZEPAM 0.5 MG: 0.5 TABLET ORAL at 17:19

## 2024-02-12 RX ADMIN — TAMSULOSIN HYDROCHLORIDE 0.4 MG: 0.4 CAPSULE ORAL at 21:59

## 2024-02-12 NOTE — CM
"Per message from patients daughter Jory, Care Coordinator will be coming to meet with patient/family Wedneday 2/14/24. CM left voicemail with Jory, requested return call. Patient still on 1:1. CM will continue to follow for discharge planning "~"needs."~"Plan; SNF pending accepting facility, unable to transfer to skilled rehab until patient off 1:1. "

## 2024-02-13 VITALS — RESPIRATION RATE: 16 BRPM | SYSTOLIC BLOOD PRESSURE: 145 MMHG | DIASTOLIC BLOOD PRESSURE: 73 MMHG

## 2024-02-13 VITALS — DIASTOLIC BLOOD PRESSURE: 77 MMHG | RESPIRATION RATE: 16 BRPM | SYSTOLIC BLOOD PRESSURE: 159 MMHG

## 2024-02-13 RX ADMIN — LORAZEPAM 0.5 MG: 0.5 TABLET ORAL at 20:33

## 2024-02-13 RX ADMIN — LORAZEPAM 0.5 MG: 0.5 TABLET ORAL at 22:33

## 2024-02-13 RX ADMIN — QUETIAPINE 50 MG: 25 TABLET, FILM COATED ORAL at 17:29

## 2024-02-13 RX ADMIN — QUETIAPINE 25 MG: 25 TABLET, FILM COATED ORAL at 13:08

## 2024-02-13 RX ADMIN — TAMSULOSIN HYDROCHLORIDE 0.4 MG: 0.4 CAPSULE ORAL at 20:33

## 2024-02-13 RX ADMIN — ANTIPRURITIC (ANTI-ITCH) 1 APPLIC: 1 CREAM TOPICAL at 21:34

## 2024-02-13 RX ADMIN — Medication 5 MG: at 20:33

## 2024-02-13 RX ADMIN — ASPIRIN 81 MG: 81 TABLET, COATED ORAL at 09:39

## 2024-02-13 RX ADMIN — ANTIPRURITIC (ANTI-ITCH) 1 APPLIC: 1 CREAM TOPICAL at 09:37

## 2024-02-13 RX ADMIN — LORAZEPAM 0.5 MG: 0.5 TABLET ORAL at 09:38

## 2024-02-13 RX ADMIN — QUETIAPINE 50 MG: 25 TABLET, FILM COATED ORAL at 09:38

## 2024-02-13 RX ADMIN — ACETAMINOPHEN 650 MG: 325 TABLET ORAL at 22:34

## 2024-02-13 NOTE — CM
"CM spoke with patients daughter, Jory, requesting call from Hospitalist in regards to plan for getting patient off of 1:1. Jory reports they have hired [facility]  and a , Anna, will be out tomorrow to meet with "~"patient and wife. CM provided fax and email per Jory's request to provide to [facility]. Jory concerned patient will not be able to get off 1:1 and therefor not able to go to SNF. CM expressed empathy and support, CM will continue to follow for "~"discharge planning needs."~"Plan; SNF pending facility acceptance, needs to be of 1:1. "

## 2024-02-14 VITALS — RESPIRATION RATE: 18 BRPM | SYSTOLIC BLOOD PRESSURE: 166 MMHG | DIASTOLIC BLOOD PRESSURE: 86 MMHG

## 2024-02-14 VITALS — DIASTOLIC BLOOD PRESSURE: 112 MMHG | SYSTOLIC BLOOD PRESSURE: 181 MMHG | RESPIRATION RATE: 18 BRPM

## 2024-02-14 RX ADMIN — LORAZEPAM 0.5 MG: 0.5 TABLET ORAL at 15:00

## 2024-02-14 RX ADMIN — LORAZEPAM 0.5 MG: 0.5 TABLET ORAL at 11:47

## 2024-02-14 RX ADMIN — QUETIAPINE 50 MG: 25 TABLET, FILM COATED ORAL at 17:07

## 2024-02-14 RX ADMIN — LORAZEPAM 0.5 MG: 0.5 TABLET ORAL at 04:05

## 2024-02-14 RX ADMIN — LORAZEPAM 0.5 MG: 0.5 TABLET ORAL at 07:05

## 2024-02-14 RX ADMIN — LORAZEPAM 0.5 MG: 0.5 TABLET ORAL at 20:04

## 2024-02-14 RX ADMIN — ANTIPRURITIC (ANTI-ITCH) 1 APPLIC: 1 CREAM TOPICAL at 07:51

## 2024-02-14 RX ADMIN — ASPIRIN 81 MG: 81 TABLET, COATED ORAL at 07:05

## 2024-02-14 RX ADMIN — TAMSULOSIN HYDROCHLORIDE 0.4 MG: 0.4 CAPSULE ORAL at 20:04

## 2024-02-14 RX ADMIN — QUETIAPINE 50 MG: 25 TABLET, FILM COATED ORAL at 07:05

## 2024-02-14 RX ADMIN — QUETIAPINE 50 MG: 25 TABLET, FILM COATED ORAL at 12:35

## 2024-02-14 RX ADMIN — ANTIPRURITIC (ANTI-ITCH) 1 APPLIC: 1 CREAM TOPICAL at 20:03

## 2024-02-14 RX ADMIN — Medication 5 MG: at 20:04
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
## 2024-02-14 NOTE — W.PN.UPDATE
"Update Note"~"Progress Note Update"~"-: "~"patient seen chart reviewed. discussed with nursing. the patient has had more periods of agitation striking out at staff .  when i saw him he was restless . not communicative.  at this point  have increase ativan to o.5 mg tid and seroquel to 50 mg "~"tid.  spoke to case management. so far no placements found for mr sprague.  "

## 2024-02-14 NOTE — CM
" reviewed patient's chart and spoke with Anna cell [phone], and patient's spouse today. Anna is from [facility] office who came out to see patient today, patient is currently on 1:1 and restraints. Patient will need to be off "~"1:1 and restraints for placement, per Anna she will reach out to her contacts at facilities to see if they could accept patient when patient is off 1:1. "~"Plan; skilled placement when patient is off 1:1 and restraints. "

## 2024-02-15 VITALS — RESPIRATION RATE: 18 BRPM | SYSTOLIC BLOOD PRESSURE: 162 MMHG | DIASTOLIC BLOOD PRESSURE: 80 MMHG

## 2024-02-15 VITALS — DIASTOLIC BLOOD PRESSURE: 76 MMHG | RESPIRATION RATE: 16 BRPM | SYSTOLIC BLOOD PRESSURE: 153 MMHG

## 2024-02-15 VITALS — RESPIRATION RATE: 16 BRPM | DIASTOLIC BLOOD PRESSURE: 77 MMHG | SYSTOLIC BLOOD PRESSURE: 138 MMHG

## 2024-02-15 RX ADMIN — LORAZEPAM 0.5 MG: 0.5 TABLET ORAL at 15:35

## 2024-02-15 RX ADMIN — ANTIPRURITIC (ANTI-ITCH) 1 APPLIC: 1 CREAM TOPICAL at 21:24

## 2024-02-15 RX ADMIN — ANTIPRURITIC (ANTI-ITCH) 1 APPLIC: 1 CREAM TOPICAL at 08:39

## 2024-02-15 RX ADMIN — Medication 5 MG: at 21:25

## 2024-02-15 RX ADMIN — LORAZEPAM 0.5 MG: 0.5 TABLET ORAL at 08:38

## 2024-02-15 RX ADMIN — ASPIRIN 81 MG: 81 TABLET, COATED ORAL at 08:38

## 2024-02-15 RX ADMIN — QUETIAPINE 50 MG: 25 TABLET, FILM COATED ORAL at 08:38

## 2024-02-15 RX ADMIN — QUETIAPINE 50 MG: 25 TABLET, FILM COATED ORAL at 13:41

## 2024-02-15 RX ADMIN — QUETIAPINE 50 MG: 25 TABLET, FILM COATED ORAL at 18:25

## 2024-02-15 RX ADMIN — LORAZEPAM 0.5 MG: 0.5 TABLET ORAL at 06:20

## 2024-02-15 RX ADMIN — TAMSULOSIN HYDROCHLORIDE 0.4 MG: 0.4 CAPSULE ORAL at 21:25

## 2024-02-15 RX ADMIN — LORAZEPAM 0.5 MG: 0.5 TABLET ORAL at 00:18

## 2024-02-15 RX ADMIN — LORAZEPAM 0.5 MG: 0.5 TABLET ORAL at 21:25

## 2024-02-15 NOTE — W.PN.UPDATE
"Update Note"~"Progress Note Update"~"-: "~"patient seen chart reviewed. spoke with nursing and with cm.   mr sprague remains much the same.  he can be quiet/sleeping and at other moments agitated and aggressive. sometimes the cause of his agitation is that he wants to stand up and walk...at "~"other times the reason is not evident.  yesterday increased seroquel and ativan. will give it a few days to assess the impact. "

## 2024-02-15 NOTE — WOUNDNOTE
"[facility] RN note: Patient developed a small stage 2 L coccyx buttocks pressure injury. Silicone border foam changed. Patient is on a [employer]acare Accumax. RN Ria Faust to coordinate switching bed to an air bed. Patient sits in a recliner intermittently "~"with an air chair cushion. Patient incontinent of urine. Liane care given and patient turned to R side with help from VIANNEY Funk. Heels off bed with air chair cushion. Discussed with VIANNEY Rollins. Updated Dr. Gutierrez who approved local wound care and "~"stand/ambulate patient q hour while in chair with air chair cushion and reposition q 2-30 minutes while in chair. Care plan to be updated. Will follow as needed. Patient is for SNF placement. t/c Spoke with wife who is POA re: skin status. "

## 2024-02-15 NOTE — CM
"CM left voicemail for [facility] [phone] at [facility] in regards to [facility] for patient, requested return call. CM will continue to follow for discharge planning needs."~"Plan; SNF pending accepting facility, family working with Anna,  from [facility], will assist in finding facilities, pt still on 1:1 and restraints. "

## 2024-02-15 NOTE — WOUNDNOTE
"[facility] RN note: Patient developed a small stage 2 L coccyx buttocks pressure injury. Silicone border foam changed. Patient is on a Versacare Accumax. RN Ria Faust to coordinate switching bed to an air bed. Patient sits in a recliner intermittently "~"with an air chair cushion. Patient incontinent of urine. Liane care given and patient turned to R side with help from VIANNEY Funk. Heels off bed with air chair cushion. Discussed with VIANNEY Rollins. Updated Dr. Gutierrez who approved local wound care and "~"stand/ambulate patient q hour while in chair with air chair cushion and reposition q 2-30 minutes while in chair. Care plan to be updated. Will follow as needed. Patient is for SNF placement. "

## 2024-02-16 VITALS — SYSTOLIC BLOOD PRESSURE: 152 MMHG | RESPIRATION RATE: 16 BRPM | DIASTOLIC BLOOD PRESSURE: 73 MMHG

## 2024-02-16 VITALS — RESPIRATION RATE: 16 BRPM | DIASTOLIC BLOOD PRESSURE: 77 MMHG | SYSTOLIC BLOOD PRESSURE: 138 MMHG

## 2024-02-16 VITALS — DIASTOLIC BLOOD PRESSURE: 73 MMHG | RESPIRATION RATE: 16 BRPM | SYSTOLIC BLOOD PRESSURE: 145 MMHG

## 2024-02-16 VITALS — DIASTOLIC BLOOD PRESSURE: 109 MMHG | RESPIRATION RATE: 16 BRPM | SYSTOLIC BLOOD PRESSURE: 134 MMHG

## 2024-02-16 RX ADMIN — QUETIAPINE 50 MG: 25 TABLET, FILM COATED ORAL at 08:17

## 2024-02-16 RX ADMIN — LORAZEPAM 0.5 MG: 0.5 TABLET ORAL at 16:23

## 2024-02-16 RX ADMIN — HYDROXYZINE HYDROCHLORIDE 10 MG: 10 TABLET, FILM COATED ORAL at 21:22

## 2024-02-16 RX ADMIN — HYDROXYZINE HYDROCHLORIDE 10 MG: 10 TABLET, FILM COATED ORAL at 16:23

## 2024-02-16 RX ADMIN — ANTIPRURITIC (ANTI-ITCH) 1 APPLIC: 1 CREAM TOPICAL at 08:17

## 2024-02-16 RX ADMIN — ASPIRIN 81 MG: 81 TABLET, COATED ORAL at 08:17

## 2024-02-16 RX ADMIN — QUETIAPINE 50 MG: 25 TABLET, FILM COATED ORAL at 17:56

## 2024-02-16 RX ADMIN — ANTIPRURITIC (ANTI-ITCH) 1 APPLIC: 1 CREAM TOPICAL at 20:28

## 2024-02-16 RX ADMIN — LORAZEPAM 0.5 MG: 0.5 TABLET ORAL at 21:22

## 2024-02-16 RX ADMIN — TAMSULOSIN HYDROCHLORIDE 0.4 MG: 0.4 CAPSULE ORAL at 21:22

## 2024-02-16 RX ADMIN — Medication 5 MG: at 21:22

## 2024-02-16 RX ADMIN — QUETIAPINE 50 MG: 25 TABLET, FILM COATED ORAL at 12:53

## 2024-02-16 RX ADMIN — LORAZEPAM 0.5 MG: 0.5 TABLET ORAL at 08:17

## 2024-02-16 NOTE — PTCARENOTE
"Pt received in bed @ 0700 in B/L soft limb restraints. Awoke calm and cooperative. Oriented to self. Able to follow simple directions. Stated he had to urinate. B/L soft limb restraints removed. Pt stood with minimal assistance to void. Followed "~"direction to return to bed. "

## 2024-02-16 NOTE — W.PN.UPDATE
"Update Note"~"Progress Note Update"~"-: "~"patient seen chart reviewed. discussed w nursing. the patient has not received a prn for agitation in 24+ hours.  he can be restless as i saw today. he then becomes very difficult to control as he is quite strong.  noted nursing attempted to wake "~"him during the night for vital signs. if the patient is sleeping asked dr martin to write an order to skip vitals as it just would be one more stimulus to potentially agitate him plus he needs to sleep. he was itching the rash. it is unclear to "~"me if it is better or worse but clearly he was scratching it. ordered a very small dose of atarax 10 mg tid which can be increased if he tolerates it.  will follow"

## 2024-02-16 NOTE — CM
"CM spoke with Anna [phone],  from [employer]. CM shared that patient is still on restraints and 1:1 at this time. CM will stay in communication with Anna in regards to the patients status, once off 1:1 and restraints, then "~"patient can discharge to accepting SNF. Cm will continue to follow for all discharge planning needs."~"Plan; SNF once off 1:1 and restraints, family working with [facility] ."

## 2024-02-16 NOTE — PTCARENOTE
"Pt was attempted to be awaken by RN for vital signs. Pt would not open eyes for RN. Pt respirations regular, VSS /77 HR 62. When pt cheek touched pt would flinch, when feet touched would move, when fingers placed in pt hand pt would squeeze "~"but would not open eyes. DANNA Villalta made aware, NP asked to come to bedside to see pt. NP at bedside believes adjustment of psych meds is what has caused this, says as long as VSS he is fine. No further orders. "

## 2024-02-16 NOTE — PTCARENOTE
Pt remains out of restraints. Drowsy, sleeping intermittently. Refused breakfast. Ate 100% of lunch and dinner.

## 2024-02-16 NOTE — W.PN.UPDATE
"Update Note"~"Progress Note Update"~"-: "~"Nursing notified me about patient's lack of agitation including what appears to be deep sleep..minimal response to sternal rub and not following direction to open eyes as examples. vital signs are stable, no tachypnea. On room air 93%. Apparently he "~"has been increasing anxious and agitated over the past month while here and his Ativan and Seroquel doses have been adjusted many times including most recently yesterday when a third dose of seroquel was added at 1300 and Ativan was given TID today "~"without and prns. "

## 2024-02-17 VITALS — DIASTOLIC BLOOD PRESSURE: 84 MMHG | SYSTOLIC BLOOD PRESSURE: 146 MMHG | RESPIRATION RATE: 16 BRPM

## 2024-02-17 VITALS — DIASTOLIC BLOOD PRESSURE: 81 MMHG | SYSTOLIC BLOOD PRESSURE: 151 MMHG | RESPIRATION RATE: 16 BRPM

## 2024-02-17 VITALS — RESPIRATION RATE: 16 BRPM | SYSTOLIC BLOOD PRESSURE: 128 MMHG | DIASTOLIC BLOOD PRESSURE: 64 MMHG

## 2024-02-17 RX ADMIN — HYDROXYZINE HYDROCHLORIDE 10 MG: 10 TABLET, FILM COATED ORAL at 17:44

## 2024-02-17 RX ADMIN — TAMSULOSIN HYDROCHLORIDE 0.4 MG: 0.4 CAPSULE ORAL at 21:44

## 2024-02-17 RX ADMIN — LORAZEPAM 0.5 MG: 0.5 TABLET ORAL at 17:44

## 2024-02-17 RX ADMIN — HYDROXYZINE HYDROCHLORIDE 10 MG: 10 TABLET, FILM COATED ORAL at 21:44

## 2024-02-17 RX ADMIN — QUETIAPINE 50 MG: 25 TABLET, FILM COATED ORAL at 17:44

## 2024-02-17 RX ADMIN — QUETIAPINE 50 MG: 25 TABLET, FILM COATED ORAL at 13:35

## 2024-02-17 RX ADMIN — HYDROXYZINE HYDROCHLORIDE 10 MG: 10 TABLET, FILM COATED ORAL at 11:04

## 2024-02-17 RX ADMIN — ASPIRIN 81 MG: 81 TABLET, COATED ORAL at 11:05

## 2024-02-17 RX ADMIN — ANTIPRURITIC (ANTI-ITCH) 1 APPLIC: 1 CREAM TOPICAL at 11:03

## 2024-02-17 RX ADMIN — QUETIAPINE 50 MG: 25 TABLET, FILM COATED ORAL at 11:04

## 2024-02-17 RX ADMIN — Medication 5 MG: at 21:44

## 2024-02-17 RX ADMIN — LORAZEPAM 0.5 MG: 0.5 TABLET ORAL at 11:04

## 2024-02-17 RX ADMIN — LORAZEPAM 0.5 MG: 0.5 TABLET ORAL at 15:19

## 2024-02-17 RX ADMIN — ANTIPRURITIC (ANTI-ITCH) 1 APPLIC: 1 CREAM TOPICAL at 21:43

## 2024-02-17 RX ADMIN — LORAZEPAM 0.5 MG: 0.5 TABLET ORAL at 21:44

## 2024-02-17 NOTE — PTCARENOTE
"Pt wakes up about 3-4 times an hour needing to urinate. Pt only is able to urinate 15-20 mls/episode while completely saturating at other times. Due to urinary frequency pt appears uncomfortable and restless which sometimes transpires to aggression/ "~"combativeness."

## 2024-02-17 NOTE — W.PN.UPDATE
"Update Note"~"Progress Note Update"~"-: "~"Psychiatry follow up. Chart reviewed. Standing Ativan and Seroquel were increased in 2/14. Patient will have to be off 1:1 and out of restraints for placement per CM. His last PRNs were on 2/15. At this time he is lying in bed somnolent and but "~"arousable and easily agitated. He remains confused and disoriented per 1:1 and nursing."~"Unable to do full MSE due to confusion/somnolence"~"Plan- continue current treatment. await clinical response to recent medication increase. Consider further adjustments if needed. Continue 1:1 for safety at this time. Psychiatry will follow. "

## 2024-02-18 VITALS — SYSTOLIC BLOOD PRESSURE: 154 MMHG | DIASTOLIC BLOOD PRESSURE: 71 MMHG | RESPIRATION RATE: 16 BRPM

## 2024-02-18 VITALS — DIASTOLIC BLOOD PRESSURE: 63 MMHG | SYSTOLIC BLOOD PRESSURE: 129 MMHG | RESPIRATION RATE: 18 BRPM

## 2024-02-18 VITALS — DIASTOLIC BLOOD PRESSURE: 82 MMHG | SYSTOLIC BLOOD PRESSURE: 152 MMHG | RESPIRATION RATE: 16 BRPM

## 2024-02-18 RX ADMIN — Medication 5 MG: at 21:34

## 2024-02-18 RX ADMIN — QUETIAPINE 50 MG: 25 TABLET, FILM COATED ORAL at 17:16

## 2024-02-18 RX ADMIN — TAMSULOSIN HYDROCHLORIDE 0.4 MG: 0.4 CAPSULE ORAL at 21:34

## 2024-02-18 RX ADMIN — HYDROXYZINE HYDROCHLORIDE 10 MG: 10 TABLET, FILM COATED ORAL at 21:34

## 2024-02-18 RX ADMIN — QUETIAPINE 50 MG: 25 TABLET, FILM COATED ORAL at 14:05

## 2024-02-18 RX ADMIN — LORAZEPAM 0.5 MG: 0.5 TABLET ORAL at 17:17

## 2024-02-18 RX ADMIN — ASPIRIN 81 MG: 81 TABLET, COATED ORAL at 10:55

## 2024-02-18 RX ADMIN — HYDROXYZINE HYDROCHLORIDE 10 MG: 10 TABLET, FILM COATED ORAL at 10:55

## 2024-02-18 RX ADMIN — HYDROXYZINE HYDROCHLORIDE 10 MG: 10 TABLET, FILM COATED ORAL at 17:17

## 2024-02-18 RX ADMIN — QUETIAPINE 50 MG: 25 TABLET, FILM COATED ORAL at 10:55

## 2024-02-18 RX ADMIN — ANTIPRURITIC (ANTI-ITCH) 1 APPLIC: 1 CREAM TOPICAL at 10:55

## 2024-02-18 RX ADMIN — LORAZEPAM 0.5 MG: 0.5 TABLET ORAL at 21:34

## 2024-02-18 RX ADMIN — ANTIPRURITIC (ANTI-ITCH) 1 APPLIC: 1 CREAM TOPICAL at 21:34

## 2024-02-18 RX ADMIN — LORAZEPAM 0.5 MG: 0.5 TABLET ORAL at 10:55

## 2024-02-18 NOTE — CM
"Chart reviewed. Patient still on 1:1. Patient will need to be off 1:1 for SNF placement. CM will continue to follow for discharge planning needs."~"Plan: SNF pending accepting facility, will need to be of 1:1 "

## 2024-02-19 VITALS — DIASTOLIC BLOOD PRESSURE: 71 MMHG | RESPIRATION RATE: 16 BRPM | SYSTOLIC BLOOD PRESSURE: 149 MMHG

## 2024-02-19 VITALS — DIASTOLIC BLOOD PRESSURE: 88 MMHG | RESPIRATION RATE: 20 BRPM | SYSTOLIC BLOOD PRESSURE: 148 MMHG

## 2024-02-19 VITALS — RESPIRATION RATE: 18 BRPM

## 2024-02-19 RX ADMIN — ASPIRIN 81 MG: 81 TABLET, COATED ORAL at 08:25

## 2024-02-19 RX ADMIN — LORAZEPAM 0.5 MG: 0.5 TABLET ORAL at 08:25

## 2024-02-19 RX ADMIN — LORAZEPAM 0.5 MG: 0.5 TABLET ORAL at 15:46

## 2024-02-19 RX ADMIN — ANTIPRURITIC (ANTI-ITCH) 1 APPLIC: 1 CREAM TOPICAL at 08:25

## 2024-02-19 RX ADMIN — LORAZEPAM 0.5 MG: 0.5 TABLET ORAL at 20:20

## 2024-02-19 RX ADMIN — QUETIAPINE 50 MG: 25 TABLET, FILM COATED ORAL at 13:01

## 2024-02-19 RX ADMIN — HYDROXYZINE HYDROCHLORIDE 10 MG: 10 TABLET, FILM COATED ORAL at 08:25

## 2024-02-19 RX ADMIN — Medication 5 MG: at 20:20

## 2024-02-19 RX ADMIN — TAMSULOSIN HYDROCHLORIDE 0.4 MG: 0.4 CAPSULE ORAL at 20:20

## 2024-02-19 RX ADMIN — HYDROXYZINE HYDROCHLORIDE 10 MG: 10 TABLET, FILM COATED ORAL at 20:20

## 2024-02-19 RX ADMIN — QUETIAPINE 50 MG: 25 TABLET, FILM COATED ORAL at 18:25

## 2024-02-19 RX ADMIN — QUETIAPINE 50 MG: 25 TABLET, FILM COATED ORAL at 08:25

## 2024-02-19 RX ADMIN — HYDROXYZINE HYDROCHLORIDE 10 MG: 10 TABLET, FILM COATED ORAL at 15:46

## 2024-02-19 RX ADMIN — LORAZEPAM 0.5 MG: 0.5 TABLET ORAL at 04:23

## 2024-02-19 NOTE — CM
" reviewed patient's chart and met with patient today and patient is still on 1:1 supervision, patient is currently of restraints."~"Plan; Placement when patient is off 1:1."

## 2024-02-20 VITALS — SYSTOLIC BLOOD PRESSURE: 139 MMHG | RESPIRATION RATE: 20 BRPM | DIASTOLIC BLOOD PRESSURE: 58 MMHG

## 2024-02-20 VITALS — SYSTOLIC BLOOD PRESSURE: 165 MMHG | RESPIRATION RATE: 18 BRPM | DIASTOLIC BLOOD PRESSURE: 94 MMHG

## 2024-02-20 RX ADMIN — HYDROXYZINE HYDROCHLORIDE 10 MG: 10 TABLET, FILM COATED ORAL at 21:17

## 2024-02-20 RX ADMIN — QUETIAPINE 50 MG: 25 TABLET, FILM COATED ORAL at 13:00

## 2024-02-20 RX ADMIN — LORAZEPAM 0.5 MG: 0.5 TABLET ORAL at 13:00

## 2024-02-20 RX ADMIN — TAMSULOSIN HYDROCHLORIDE 0.4 MG: 0.4 CAPSULE ORAL at 21:17

## 2024-02-20 RX ADMIN — LORAZEPAM 0.5 MG: 0.5 TABLET ORAL at 21:17

## 2024-02-20 RX ADMIN — QUETIAPINE 50 MG: 25 TABLET, FILM COATED ORAL at 09:10

## 2024-02-20 RX ADMIN — QUETIAPINE 50 MG: 25 TABLET, FILM COATED ORAL at 18:02

## 2024-02-20 RX ADMIN — Medication 5 MG: at 21:17

## 2024-02-20 RX ADMIN — ASPIRIN 81 MG: 81 TABLET, COATED ORAL at 09:10

## 2024-02-20 RX ADMIN — HYDROXYZINE HYDROCHLORIDE 10 MG: 10 TABLET, FILM COATED ORAL at 09:10

## 2024-02-20 RX ADMIN — HYDROXYZINE HYDROCHLORIDE 10 MG: 10 TABLET, FILM COATED ORAL at 15:52

## 2024-02-20 RX ADMIN — ANTIPRURITIC (ANTI-ITCH) 1 APPLIC: 1 CREAM TOPICAL at 20:25

## 2024-02-20 RX ADMIN — LORAZEPAM 0.5 MG: 0.5 TABLET ORAL at 15:52

## 2024-02-20 RX ADMIN — ACETAMINOPHEN 650 MG: 325 TABLET ORAL at 09:10

## 2024-02-20 RX ADMIN — LORAZEPAM 0.5 MG: 0.5 TABLET ORAL at 09:10

## 2024-02-20 NOTE — PN.CDI
"CDI"~"- -"~"CDI: "~"Physician Documentation Request"~"Admit Date: 01/19/24 09:01"~"Dear Doctor Shailesh, "~"Please review the following and provide your response in the progress notes. "~"Clinical Indicators: "~"02/15/24 16:53 - Wound Note "~"#[facility] RN note: Patient developed a small stage 2 L coccyx buttocks pressure injury.  "~"Physician documentation of the type and location of wounds is required for compliant documentation. Based on the above clinical findings and your assessment, please provide the following in your progress note:  "~"	Developed stage 2 left coccyx / buttocks pressure injury"~"	Other (please specify)"~"1. Location of the ulcer/wound, including laterality."~"2. Type (etiology) of ulcer/wound:"~"	- Diabetic ulcer"~"	- Arterial (ischemic) ulcer"~"	- Traumatic wound"~"	- Pressure (decubitus) ulcer"~"3. If a pressure ulcer, please also include the stage* of the ulcer:"~"	- Stage 1 - Skin intact, non-blanchable redness"~"	- Stage 2 - Partial thickness loss of dermis, includes intact or open blister"~"	- Stage 3 - Full thickness tissue not including bone, tendon or muscle"~"	- Stage 4 - Full thickness tissue loss, including exposed bone, tendon or muscle"~"	"~"Use of terms such as suspected, likely, concern for, or probable (associated with a specific diagnosis that is being evaluated, monitored, or treated as if it exists) are acceptable and can be coded in the inpatient setting, when documented at the "~"time of discharge. "~"Thank you, "~"Sarina Padilla RN BSN CCDS"~"CDI Specialist"~"please contact via tiger text"~"Please use your independent medical judgment in providing your response."~"*Source: National Pressure Ulcer Advisory Panel (NPUAP)"

## 2024-02-20 NOTE — CM
"CM reviewed chart. Patient remains on 1:1, restraints added. CM will continue to follow for discharge planning needs."~"Plan; SNF once off restraints and 1:1, [facility], update JESUS Castillo once patient off 1:1"

## 2024-02-20 NOTE — PTCARENOTE
"pt woke up attempting to get out of bed. tried to use urinal and assess if pt had to use the bathroom.  pt  puling at gown and depends and sliding out of bed.  when tried to redirect, pt swung at nurse and pulled leg back to kick.  code purple "~"called.  calming attitude used by all associates.  pt placed in restraints for safety.  pt given morning meds as ordered.  pt fed breakfast.  "

## 2024-02-20 NOTE — PTCARENOTE
pt wakes to name.  garbled slow speech.  confused.  follows minimal commands.  will become agitated with do much redirection.  one person assist with walker to bathroom.  nurse sitting in room for fall risk.

## 2024-02-21 VITALS — SYSTOLIC BLOOD PRESSURE: 146 MMHG | RESPIRATION RATE: 18 BRPM | DIASTOLIC BLOOD PRESSURE: 81 MMHG

## 2024-02-21 VITALS — SYSTOLIC BLOOD PRESSURE: 129 MMHG | DIASTOLIC BLOOD PRESSURE: 64 MMHG | RESPIRATION RATE: 18 BRPM

## 2024-02-21 RX ADMIN — Medication 5 MG: at 21:45

## 2024-02-21 RX ADMIN — ANTIPRURITIC (ANTI-ITCH) 1 APPLIC: 1 CREAM TOPICAL at 08:27

## 2024-02-21 RX ADMIN — HYDROPHOR 1 APPLIC: 42 OINTMENT TOPICAL at 15:31

## 2024-02-21 RX ADMIN — LORAZEPAM 0.5 MG: 0.5 TABLET ORAL at 00:33

## 2024-02-21 RX ADMIN — HYDROXYZINE HYDROCHLORIDE 10 MG: 10 TABLET, FILM COATED ORAL at 21:45

## 2024-02-21 RX ADMIN — LORAZEPAM 0.5 MG: 0.5 TABLET ORAL at 08:26

## 2024-02-21 RX ADMIN — QUETIAPINE 50 MG: 25 TABLET, FILM COATED ORAL at 17:50

## 2024-02-21 RX ADMIN — ASPIRIN 81 MG: 81 TABLET, COATED ORAL at 08:26

## 2024-02-21 RX ADMIN — LORAZEPAM 0.5 MG: 0.5 TABLET ORAL at 21:45

## 2024-02-21 RX ADMIN — LORAZEPAM 0.5 MG: 0.5 TABLET ORAL at 15:36

## 2024-02-21 RX ADMIN — HYDROXYZINE HYDROCHLORIDE 10 MG: 10 TABLET, FILM COATED ORAL at 08:26

## 2024-02-21 RX ADMIN — HYDROXYZINE HYDROCHLORIDE 10 MG: 10 TABLET, FILM COATED ORAL at 15:36

## 2024-02-21 RX ADMIN — TAMSULOSIN HYDROCHLORIDE 0.4 MG: 0.4 CAPSULE ORAL at 21:45

## 2024-02-21 RX ADMIN — QUETIAPINE 50 MG: 25 TABLET, FILM COATED ORAL at 08:26

## 2024-02-21 RX ADMIN — QUETIAPINE 50 MG: 25 TABLET, FILM COATED ORAL at 12:14

## 2024-02-21 NOTE — W.PN.UPDATE
"Update Note"~"Progress Note Update"~"-: "~"patient seen chart reviewed. spoke with nursing. the patient remains much the same with periods of intermittent agitation. in the last several days he has occasionally received one dose of a prn daily but he often remains in soft restraints and his "~"condition necessitates a one to one.  nursing told me the family has expressed a desire for him to try rexulti which has been approved for agitation in dementia which is not formulary here at .  i could possibly get samples although i am not sure "~"i could get them to treat him long term. will  look into this possibility. another possibility is that perhaps his insurance would cover it and family could  scrip at local pharmacy and bring it in to .  it is of course not in any way "~"certain t hat it would help mr castle but it does have an fda approved indication for treatment in this scenario. will follow"

## 2024-02-21 NOTE — PTCARENOTE
"pt taking off restraints. pt is calm and takes redirection  - but does get agitated and aggressive- but listens to this RN. pt was taken out of restraints. pt walkeded, used toliet, and placed in recliner and took stroll through unit. pt in pleasant "~"mood and is singing.  "

## 2024-02-21 NOTE — DOWNTIME
"There was a [facility] [facility]  Downtime on 2/21/2024 from 0111 to 2/21/2024 at 0405. Downtime documentation of patient's care, including medication administrations, has been reconciled in the electronic record per guidelines. Refer to the "~"patient's paper chart under the miscellaneous tab to see printed paper medication records and downtime forms."

## 2024-02-22 VITALS — DIASTOLIC BLOOD PRESSURE: 69 MMHG | RESPIRATION RATE: 15 BRPM | SYSTOLIC BLOOD PRESSURE: 143 MMHG

## 2024-02-22 VITALS — DIASTOLIC BLOOD PRESSURE: 65 MMHG | RESPIRATION RATE: 16 BRPM | SYSTOLIC BLOOD PRESSURE: 176 MMHG

## 2024-02-22 RX ADMIN — HYDROXYZINE HYDROCHLORIDE 10 MG: 10 TABLET, FILM COATED ORAL at 19:39

## 2024-02-22 RX ADMIN — QUETIAPINE 50 MG: 25 TABLET, FILM COATED ORAL at 19:39

## 2024-02-22 RX ADMIN — TAMSULOSIN HYDROCHLORIDE 0.4 MG: 0.4 CAPSULE ORAL at 21:10

## 2024-02-22 RX ADMIN — HYDROXYZINE HYDROCHLORIDE 10 MG: 10 TABLET, FILM COATED ORAL at 07:39

## 2024-02-22 RX ADMIN — HYDROPHOR 1 APPLIC: 42 OINTMENT TOPICAL at 07:39

## 2024-02-22 RX ADMIN — QUETIAPINE: 25 TABLET, FILM COATED ORAL at 17:46

## 2024-02-22 RX ADMIN — QUETIAPINE 50 MG: 25 TABLET, FILM COATED ORAL at 07:35

## 2024-02-22 RX ADMIN — LORAZEPAM 0.5 MG: 0.5 TABLET ORAL at 21:09

## 2024-02-22 RX ADMIN — ASPIRIN 81 MG: 81 TABLET, COATED ORAL at 07:35

## 2024-02-22 RX ADMIN — LORAZEPAM 0.5 MG: 0.5 TABLET ORAL at 07:39

## 2024-02-22 RX ADMIN — LORAZEPAM 0.5 MG: 0.5 TABLET ORAL at 20:09

## 2024-02-22 RX ADMIN — QUETIAPINE 50 MG: 25 TABLET, FILM COATED ORAL at 13:48

## 2024-02-22 RX ADMIN — LORAZEPAM 0.5 MG: 0.5 TABLET ORAL at 17:09

## 2024-02-22 RX ADMIN — Medication 5 MG: at 21:09

## 2024-02-22 NOTE — W.PN.UPDATE
"Addendum entered and electronically signed by Isacc Dunne MD  02/22/24 13:31: "~"since we are unable to do rexulti will try increasing the seroquel  to 50 mg qid  .  on another note patient's rash while still visible seems better. i have noted less scratching.  will taper and dc hydroxyzine. "~"Addendum entered and electronically signed by Isacc Dunne MD  02/22/24 13:28: "~"pharmacy returned call.  the cost will be over 400 monthly . i spoke to mrs sprague and they cannot afford this price.  pharma sponsored indigent programs generally do not allow patients on medicare to participate and receive the drugs free of "~"charge.  while we could get samples for a short time could not get them for the rest of mr sprague's life so that is not a practicial plan.  "~"Addendum entered and electronically signed by Isacc Dunne MD  02/22/24 13:20: "~"patient's wife is willing to do rexulti.  called their pharmacy  price is over 400 monthly for rexulti w their insurance  pharmacist said she will call insurance company and get back to me if there is anything that could be done to reduce cost.  "~"Original Note:"~"Update Note"~"Progress Note Update"~"-: "~"patient seen chart reviewed. spoke to patient's wife about trying to get rexulti from their local pharmacy . rexulti is the only medication approved to rx agitation in dementia.  the patient remains much the same with periods of intermittent "~"agitation and requiring a one to one for a number or reasons including agitation, fall risk, etc etc.  wife does agree and will get me the name and phone number of the pharmacy they use and i will call and see if insurance will cover it.  have been "~"discussing with nursing staff whether we could try to take mr castle off one to one which has proven improssible for prolonged periods up until  now. "

## 2024-02-22 NOTE — CM
"Addendum entered by Nadine Russ  02/22/24 12:13: "~"CM received voicemail from patients daughter, Jory. CM left message for Jory with call back number."~"Original Note:"~"CM sent referral to [facility] through [facility], faxed paper copy to Matt Hoyt, Andrade, phone ([phone]) fax ([phone]), left voicemail for Matt to discuss patient. CM will continue to follow for discharge planning "~"needs."~"Plan; SNF pending placement, patient off restraints, will need to be off 1:1. " Protopic Counseling: Patient may experience a mild burning sensation during topical application. Protopic is not approved in children less than 2 years of age. There have been case reports of hematologic and skin malignancies in patients using topical calcineurin inhibitors although causality is questionable.

## 2024-02-23 VITALS — DIASTOLIC BLOOD PRESSURE: 85 MMHG | RESPIRATION RATE: 18 BRPM | SYSTOLIC BLOOD PRESSURE: 154 MMHG

## 2024-02-23 VITALS — DIASTOLIC BLOOD PRESSURE: 97 MMHG | SYSTOLIC BLOOD PRESSURE: 202 MMHG | RESPIRATION RATE: 19 BRPM

## 2024-02-23 RX ADMIN — LORAZEPAM 0.5 MG: 0.5 TABLET ORAL at 16:33

## 2024-02-23 RX ADMIN — HYDROXYZINE HYDROCHLORIDE 10 MG: 10 TABLET, FILM COATED ORAL at 08:33

## 2024-02-23 RX ADMIN — HYDROPHOR 1 APPLIC: 42 OINTMENT TOPICAL at 08:33

## 2024-02-23 RX ADMIN — Medication 5 MG: at 21:18

## 2024-02-23 RX ADMIN — QUETIAPINE 50 MG: 25 TABLET, FILM COATED ORAL at 12:28

## 2024-02-23 RX ADMIN — LORAZEPAM 0.5 MG: 0.5 TABLET ORAL at 23:16

## 2024-02-23 RX ADMIN — LORAZEPAM 0.5 MG: 0.5 TABLET ORAL at 08:33

## 2024-02-23 RX ADMIN — HYDROXYZINE HYDROCHLORIDE 10 MG: 10 TABLET, FILM COATED ORAL at 19:20

## 2024-02-23 RX ADMIN — QUETIAPINE 50 MG: 25 TABLET, FILM COATED ORAL at 08:33

## 2024-02-23 RX ADMIN — DIVALPROEX SODIUM 250 MG: 250 TABLET, DELAYED RELEASE ORAL at 16:34

## 2024-02-23 RX ADMIN — QUETIAPINE 50 MG: 25 TABLET, FILM COATED ORAL at 16:34

## 2024-02-23 RX ADMIN — TAMSULOSIN HYDROCHLORIDE 0.4 MG: 0.4 CAPSULE ORAL at 21:19

## 2024-02-23 RX ADMIN — DIVALPROEX SODIUM 250 MG: 250 TABLET, DELAYED RELEASE ORAL at 21:19

## 2024-02-23 RX ADMIN — LORAZEPAM 0.5 MG: 0.5 TABLET ORAL at 03:39

## 2024-02-23 RX ADMIN — ASPIRIN 81 MG: 81 TABLET, COATED ORAL at 08:34

## 2024-02-23 RX ADMIN — QUETIAPINE 50 MG: 25 TABLET, FILM COATED ORAL at 19:51

## 2024-02-23 NOTE — CM
"Addendum entered by Nadine Russ  02/23/24 15:04: "~"CM spoke with Candice ([phone]) from [facility], Candice spoke with patients daughter, plan for DON from [facility] to come assess patient Monday morning. Patients wife scheduled to tour facility Monday evening. CM will continue to follow "~"for discharge planning needs, will follow up with Jatinder on Monday."~"Addendum entered by Nadine Russ  02/23/24 10:56: "~"TT sent to [facility] in regards to patients daughter requesting phone call. CM received return call from Sosa at [place], unable to accept patient. CM received phone call from [facility], unable to accept patient."~"Addendum entered by Nadine Russ  02/23/24 09:38: "~"CM spoke with Sosa, Andrade at [facility], faxed clinicals to [phone]."~"Original Note:"~"[facility], awaiting responses. CM spoke with patients daughter Jory Corley requesting to become the primary contact. CM spoke with Admissions, will switch Jory to primary. Jory "~"inquiring about hospice and if this would be appropriate for her father. Jory requesting a call from Dr. Dunne, as she would like to explore the medication for her father, TT sent to Dr. Dunne. Jory inquiring if PT is able to work with her "~"father more to get him moving around, maybe it would help the agitation. CM will continue to follow for discharge planning needs."~"Plan; placement pending, patient still on 1:1, off restraints.   "

## 2024-02-23 NOTE — HOSPNOTE
Spoke at length with daughter about hospice and the philosophy. They will have a family meeting and get back to us.

## 2024-02-23 NOTE — W.PN.UPDATE
"Addendum entered and electronically signed by Isacc Dunne MD  02/23/24 10:04: "~"blood level on tuesday of next week. "~"Original Note:"~"Update Note"~"Progress Note Update"~"-: "~"patient seen chart reviewed, spoke with nursing. also called patient's daughter veronica who is primary contact. we talked about the rexulti issue...cost, possible benefits etc.  she asked about the hx of med rx...his rash etc.  reconsidered what he "~"has been on nothing of which has helped. she felt PT could be useful for him and if he were more active it might mitigate his agitation and reduce risk for falls. I reviewed PT records with her and he has either been too agitated and / or in "~"restraints for PT,  sleeping and no one wanted to wake him so has not had PT in some days. i am not so confident that it would help and i do worry that he could get agitated/difficult to control  in the halls as PT tried to walk him,  asked pharmacy "~"to review whether mr sprague had ever been on depakote to their knowledge. they have records from out pt. he apparently has not.  i had considered this in the past but given lamictal was wary of inc risk of rash by prescribing both depakote and "~"lamictal which has been described. he is  no longer on lamictal so this clears the way for depakote which i discussed w daughter. will start 250 mg tid and follow blood levels. will continue other meds as they are. when i saw him this am mr sprague "~"actively disrobed . he was then sitting naked in  his chair eating his breakfast with rn staff observing.  it did not seem prudent to force him to dress at that moment. psych will follow                                                                "~"                                                        "

## 2024-02-23 NOTE — PTCARENOTE
pt saturated in urine. attempted to change linens and brief. pt became aggressive and agitated. po ativan given w/o issue (see mar). no further incident. Will monitor.

## 2024-02-24 VITALS — DIASTOLIC BLOOD PRESSURE: 70 MMHG | RESPIRATION RATE: 18 BRPM | SYSTOLIC BLOOD PRESSURE: 151 MMHG

## 2024-02-24 RX ADMIN — QUETIAPINE 50 MG: 25 TABLET, FILM COATED ORAL at 16:02

## 2024-02-24 RX ADMIN — ASPIRIN 81 MG: 81 TABLET, COATED ORAL at 07:56

## 2024-02-24 RX ADMIN — HYDROPHOR 1 APPLIC: 42 OINTMENT TOPICAL at 07:56

## 2024-02-24 RX ADMIN — DIVALPROEX SODIUM 250 MG: 250 TABLET, DELAYED RELEASE ORAL at 07:56

## 2024-02-24 RX ADMIN — Medication 5 MG: at 21:00

## 2024-02-24 RX ADMIN — HYDROXYZINE HYDROCHLORIDE 10 MG: 10 TABLET, FILM COATED ORAL at 07:56

## 2024-02-24 RX ADMIN — CALAMINE 8% AND ZINC OXIDE 8% 1 ML: 160 LOTION TOPICAL at 00:59

## 2024-02-24 RX ADMIN — ANTI-FUNGAL POWDER MICONAZOLE NITRATE TALC FREE 1 APPLIC: 1.42 POWDER TOPICAL at 19:03

## 2024-02-24 RX ADMIN — LORAZEPAM 0.5 MG: 0.5 TABLET ORAL at 16:01

## 2024-02-24 RX ADMIN — DIVALPROEX SODIUM 250 MG: 250 TABLET, DELAYED RELEASE ORAL at 21:00

## 2024-02-24 RX ADMIN — LORAZEPAM 0.5 MG: 0.5 TABLET ORAL at 07:56

## 2024-02-24 RX ADMIN — TAMSULOSIN HYDROCHLORIDE 0.4 MG: 0.4 CAPSULE ORAL at 21:00

## 2024-02-24 RX ADMIN — QUETIAPINE 50 MG: 25 TABLET, FILM COATED ORAL at 11:25

## 2024-02-24 RX ADMIN — QUETIAPINE 50 MG: 25 TABLET, FILM COATED ORAL at 08:08

## 2024-02-24 RX ADMIN — HYDROXYZINE HYDROCHLORIDE 10 MG: 10 TABLET, FILM COATED ORAL at 19:03

## 2024-02-24 RX ADMIN — QUETIAPINE 50 MG: 25 TABLET, FILM COATED ORAL at 19:03

## 2024-02-24 RX ADMIN — LORAZEPAM 0.5 MG: 0.5 TABLET ORAL at 22:49

## 2024-02-24 RX ADMIN — CALAMINE 8% AND ZINC OXIDE 8% 180 ML: 160 LOTION TOPICAL at 10:32

## 2024-02-24 RX ADMIN — DIVALPROEX SODIUM 250 MG: 250 TABLET, DELAYED RELEASE ORAL at 16:01

## 2024-02-24 NOTE — PTCARENOTE
Pt has been itching throughout the night, having difficulty sleeping. NP on called notified and another cream order.

## 2024-02-25 VITALS — RESPIRATION RATE: 16 BRPM | DIASTOLIC BLOOD PRESSURE: 75 MMHG | SYSTOLIC BLOOD PRESSURE: 137 MMHG

## 2024-02-25 VITALS — SYSTOLIC BLOOD PRESSURE: 141 MMHG | DIASTOLIC BLOOD PRESSURE: 99 MMHG | RESPIRATION RATE: 16 BRPM

## 2024-02-25 VITALS — SYSTOLIC BLOOD PRESSURE: 164 MMHG | DIASTOLIC BLOOD PRESSURE: 92 MMHG

## 2024-02-25 RX ADMIN — LORAZEPAM 0.5 MG: 0.5 TABLET ORAL at 10:52

## 2024-02-25 RX ADMIN — ANTI-FUNGAL POWDER MICONAZOLE NITRATE TALC FREE 1 APPLIC: 1.42 POWDER TOPICAL at 20:07

## 2024-02-25 RX ADMIN — DIVALPROEX SODIUM 250 MG: 250 TABLET, DELAYED RELEASE ORAL at 07:21

## 2024-02-25 RX ADMIN — HYDROXYZINE HYDROCHLORIDE 10 MG: 10 TABLET, FILM COATED ORAL at 07:21

## 2024-02-25 RX ADMIN — LORAZEPAM 0.5 MG: 0.5 TABLET ORAL at 07:21

## 2024-02-25 RX ADMIN — LORAZEPAM 0.5 MG: 0.5 TABLET ORAL at 21:41

## 2024-02-25 RX ADMIN — HYDROPHOR 1 APPLIC: 42 OINTMENT TOPICAL at 07:22

## 2024-02-25 RX ADMIN — Medication 5 MG: at 21:41

## 2024-02-25 RX ADMIN — DIVALPROEX SODIUM 250 MG: 250 TABLET, DELAYED RELEASE ORAL at 15:53

## 2024-02-25 RX ADMIN — CALAMINE 8% AND ZINC OXIDE 8% 1 ML: 160 LOTION TOPICAL at 20:09

## 2024-02-25 RX ADMIN — QUETIAPINE 50 MG: 25 TABLET, FILM COATED ORAL at 10:51

## 2024-02-25 RX ADMIN — ASPIRIN 81 MG: 81 TABLET, COATED ORAL at 07:21

## 2024-02-25 RX ADMIN — QUETIAPINE 50 MG: 25 TABLET, FILM COATED ORAL at 15:56

## 2024-02-25 RX ADMIN — LORAZEPAM 0.5 MG: 0.5 TABLET ORAL at 15:53

## 2024-02-25 RX ADMIN — DIVALPROEX SODIUM 250 MG: 250 TABLET, DELAYED RELEASE ORAL at 21:41

## 2024-02-25 RX ADMIN — ANTI-FUNGAL POWDER MICONAZOLE NITRATE TALC FREE 1 APPLIC: 1.42 POWDER TOPICAL at 07:22

## 2024-02-25 RX ADMIN — QUETIAPINE 50 MG: 25 TABLET, FILM COATED ORAL at 20:40

## 2024-02-25 RX ADMIN — QUETIAPINE 50 MG: 25 TABLET, FILM COATED ORAL at 07:31

## 2024-02-25 RX ADMIN — TAMSULOSIN HYDROCHLORIDE 0.4 MG: 0.4 CAPSULE ORAL at 21:41

## 2024-02-26 VITALS — DIASTOLIC BLOOD PRESSURE: 71 MMHG | RESPIRATION RATE: 18 BRPM | SYSTOLIC BLOOD PRESSURE: 145 MMHG

## 2024-02-26 VITALS — RESPIRATION RATE: 18 BRPM | SYSTOLIC BLOOD PRESSURE: 160 MMHG | DIASTOLIC BLOOD PRESSURE: 82 MMHG

## 2024-02-26 RX ADMIN — DIVALPROEX SODIUM 250 MG: 250 TABLET, DELAYED RELEASE ORAL at 11:21

## 2024-02-26 RX ADMIN — QUETIAPINE 50 MG: 25 TABLET, FILM COATED ORAL at 16:36

## 2024-02-26 RX ADMIN — QUETIAPINE: 25 TABLET, FILM COATED ORAL at 11:21

## 2024-02-26 RX ADMIN — QUETIAPINE 50 MG: 25 TABLET, FILM COATED ORAL at 20:26

## 2024-02-26 RX ADMIN — ANTI-FUNGAL POWDER MICONAZOLE NITRATE TALC FREE 1 APPLIC: 1.42 POWDER TOPICAL at 20:27

## 2024-02-26 RX ADMIN — DIVALPROEX SODIUM 250 MG: 250 TABLET, DELAYED RELEASE ORAL at 23:02

## 2024-02-26 RX ADMIN — ANTI-FUNGAL POWDER MICONAZOLE NITRATE TALC FREE 1 APPLIC: 1.42 POWDER TOPICAL at 12:47

## 2024-02-26 RX ADMIN — LORAZEPAM 0.5 MG: 0.5 TABLET ORAL at 11:22

## 2024-02-26 RX ADMIN — LORAZEPAM 0.5 MG: 0.5 TABLET ORAL at 16:36

## 2024-02-26 RX ADMIN — QUETIAPINE 50 MG: 25 TABLET, FILM COATED ORAL at 12:48

## 2024-02-26 RX ADMIN — DIVALPROEX SODIUM 250 MG: 250 TABLET, DELAYED RELEASE ORAL at 16:36

## 2024-02-26 RX ADMIN — TAMSULOSIN HYDROCHLORIDE 0.4 MG: 0.4 CAPSULE ORAL at 23:02

## 2024-02-26 RX ADMIN — LORAZEPAM 0.5 MG: 0.5 TABLET ORAL at 23:02

## 2024-02-26 RX ADMIN — Medication 5 MG: at 23:02

## 2024-02-26 RX ADMIN — ASPIRIN 81 MG: 81 TABLET, COATED ORAL at 11:21

## 2024-02-26 RX ADMIN — HYDROPHOR 1 APPLIC: 42 OINTMENT TOPICAL at 12:48

## 2024-02-26 NOTE — CM
"CM spoke with Bernardo from [facility] ([phone]), nurse  will be coming out this afternoon to assess patient. Per Bernardo, family touring facility later this afternoon. CM relayed patient remains on 1:1, no longer on restraints, "~"behaviors appear to be improving. CM will be available when nurse arrives to do assessment. CM will update Anna from [facility]. CM will continue to follow for discharge planing needs."~"Plan; awaiting eval from [facility]."

## 2024-02-26 NOTE — WOUNDNOTE
"[facility] RN note: Patient seen for HAPI report for new open area R buttocks. R sacral/buttocks with .4x.4x.1cm stage 2 open area. L sacral/coccyx stage 2 ulcer scabbed. Silicone border foam dressing applied. Patient is on a Versacare air bed. VIANNEY Mcdaniels to "~"coordinate switching his bed to a Versacare air bed. Air chair cushion in room. Patient moves his legs frequently. He can be difficult to turn r/t his dementia. VIANNEY Mcdaniels assisted with turning patient to his L side for skin assessment. He continues "~"with body rash. Took photo of R posterior leg and R flank rash for documentation. Updated Dr. Valdez re: new small open area on R sacrum, rash pics in chart R flank/R posterior LE to review. "

## 2024-02-26 NOTE — WOUNDNOTE
"[facility] RN note: Patient seen for HAPI report for new open area R buttocks. R sacral/buttocks with .4x.4x.1cm stage 2 open area. L sacral/coccyx stage 2 ulcer scabbed. Silicone border foam dressing applied. Patient is on a Versa[employer] accumax bed. RN "~"Enedelia to coordinate switching his bed to a Versacare air bed (bed tech obtained). Air chair cushion in room. Patient moves his legs frequently. He can be difficult to turn r/t his dementia. VIANNEY Mcdaniels assisted with turning patient to his L side for "~"skin assessment. He continues with body rash. Took photo of R posterior leg and R flank rash for documentation. Updated Dr. Valdez re: new small open area on R sacrum, rash pics in chart R flank/R posterior LE for review. "

## 2024-02-27 VITALS — DIASTOLIC BLOOD PRESSURE: 79 MMHG | RESPIRATION RATE: 14 BRPM | SYSTOLIC BLOOD PRESSURE: 145 MMHG

## 2024-02-27 VITALS — RESPIRATION RATE: 16 BRPM | SYSTOLIC BLOOD PRESSURE: 151 MMHG | DIASTOLIC BLOOD PRESSURE: 80 MMHG

## 2024-02-27 VITALS — SYSTOLIC BLOOD PRESSURE: 138 MMHG | DIASTOLIC BLOOD PRESSURE: 74 MMHG | RESPIRATION RATE: 18 BRPM

## 2024-02-27 LAB
ALBUMIN SERPL-MCNC: 3.6 G/DL (ref 3.5–5)
ALP SERPL-CCNC: 56 U/L (ref 38–126)
ALT SERPL-CCNC: 14 U/L (ref 0–50)
AST SERPL-CCNC: 22 U/L (ref 17–59)
BUN SERPL-MCNC: 16 MG/DL (ref 9–20)
CALCIUM SERPL-MCNC: 9.5 MG/DL (ref 8.4–10.2)
CHLORIDE SERPL-SCNC: 101 MMOL/L (ref 98–107)
CO2 SERPL-SCNC: 31 MMOL/L (ref 22–30)
EGFR: > 60
ERYTHROCYTE [DISTWIDTH] IN BLOOD BY AUTOMATED COUNT: 13.6 % (ref 11.5–14.5)
ESTIMATED CREATININE CLEARANCE: 77 ML/MIN
GLUCOSE SERPL-MCNC: 88 MG/DL (ref 70–99)
HCT VFR BLD AUTO: 44.1 % (ref 39–52)
HGB BLD-MCNC: 14.7 G/DL (ref 13–18)
MCHC RBC AUTO-ENTMCNC: 33.3 G/DL (ref 33–37)
MCV RBC AUTO: 94.8 FL (ref 80–94)
PLATELET # BLD AUTO: 121 10^3/UL (ref 130–400)
POTASSIUM SERPL-SCNC: 3.9 MMOL/L (ref 3.5–5.1)
PROT SERPL-MCNC: 6.6 G/DL (ref 6.3–8.2)
SODIUM SERPL-SCNC: 140 MMOL/L (ref 135–145)
VALPROATE SERPL-MCNC: 64.4 UG/ML (ref 50–120)

## 2024-02-27 RX ADMIN — QUETIAPINE 50 MG: 25 TABLET, FILM COATED ORAL at 12:30

## 2024-02-27 RX ADMIN — DIVALPROEX SODIUM 250 MG: 250 TABLET, DELAYED RELEASE ORAL at 16:26

## 2024-02-27 RX ADMIN — ASPIRIN 81 MG: 81 TABLET, COATED ORAL at 06:51

## 2024-02-27 RX ADMIN — DIVALPROEX SODIUM 250 MG: 250 TABLET, DELAYED RELEASE ORAL at 06:51

## 2024-02-27 RX ADMIN — DOXYCYCLINE HYCLATE 100 MG: 100 CAPSULE ORAL at 20:07

## 2024-02-27 RX ADMIN — LORAZEPAM 0.5 MG: 0.5 TABLET ORAL at 20:15

## 2024-02-27 RX ADMIN — TAMSULOSIN HYDROCHLORIDE 0.4 MG: 0.4 CAPSULE ORAL at 20:15

## 2024-02-27 RX ADMIN — DIVALPROEX SODIUM 250 MG: 250 TABLET, DELAYED RELEASE ORAL at 20:15

## 2024-02-27 RX ADMIN — LORAZEPAM 0.5 MG: 0.5 TABLET ORAL at 06:51

## 2024-02-27 RX ADMIN — LORAZEPAM 0.5 MG: 0.5 TABLET ORAL at 03:03

## 2024-02-27 RX ADMIN — QUETIAPINE 50 MG: 25 TABLET, FILM COATED ORAL at 06:56

## 2024-02-27 RX ADMIN — ANTI-FUNGAL POWDER MICONAZOLE NITRATE TALC FREE 1 APPLIC: 1.42 POWDER TOPICAL at 06:52

## 2024-02-27 RX ADMIN — ANTI-FUNGAL POWDER MICONAZOLE NITRATE TALC FREE 1 APPLIC: 1.42 POWDER TOPICAL at 20:26

## 2024-02-27 RX ADMIN — HYDROPHOR 1 APPLIC: 42 OINTMENT TOPICAL at 06:52

## 2024-02-27 RX ADMIN — LORAZEPAM 0.5 MG: 0.5 TABLET ORAL at 17:24

## 2024-02-27 RX ADMIN — ACETAMINOPHEN 650 MG: 325 TABLET ORAL at 06:51

## 2024-02-27 RX ADMIN — QUETIAPINE 50 MG: 25 TABLET, FILM COATED ORAL at 16:26

## 2024-02-27 RX ADMIN — Medication 5 MG: at 20:15

## 2024-02-27 RX ADMIN — QUETIAPINE 50 MG: 25 TABLET, FILM COATED ORAL at 20:08

## 2024-02-27 NOTE — PTCARENOTE
"Pt resting comfortably this morning, slept in until 11 with intermittent waking periods to urinate. Shortly after he was assisted into the recliner for breakfast. Pt ate 65% of his breakfast and then became restless again. Assisted pt to the [facility] "~"where he had a large BM. He has trouble following commands which makes goal directed ambulation difficult. He is very steady on his feet and is quite strong. 1:1 maintained. "

## 2024-02-27 NOTE — PTCARENOTE
"Received report from angela RN, assumed care of patient at 1900. Patient transferred via bed into private room 424 at change of shift, resting comfortably in bed at this time. 1:1 nursing maintained."~"~2000 Patient became restless in bed, multiple attempts made to throw legs over side rail to get OOB, patient unable to state needs and does not answer appropriately when asked. Patient unable to direct, impulsive, pulling at gown, took socks off "~"and thrown across room, pulling at incontinence pads, and blankets/sheets. Patient incontinent and saturated in urine at this time, hygiene care provided. Attempted diversional activities without success. PM medicated given crushed in applesauce "~"with no issues, see MAR. 1:1 nursing maintained, care ongoing."

## 2024-02-27 NOTE — CM
"Addendum entered by Nadine Russ  02/27/24 15:33: "~"VELVET spoke with  from [facility], at this time, they are unable to accept patient.  reports if anything changes in regards to patients behavior, to reach out. CM will call patients daughter to provide update."~"Original Note:"~"VELVET met with nurse  from [facility], ([phone]).  reports she was unable to assess patient yesterday as he was asleep.  reports that is why she came out this morning to see if it was a better time to assess patient. "~"Unfortunately patient is asleep and  is unable to assess.  reports she has some availability tomorrow and is more available Thursday.  will report to her Director and will update CM on next steps for coming out this week. "~" inquiring about patients behaviors, CM discussed per nursing, patient has been calmer but does still have aggressive behaviors.  wants to ensure facility can accept patient safely and not have patient return to Hospital. CM left "~"voicemail for patients daughter with update. CM will continue to follow for discharge planning needs."~"Plan; awaiting assessment from [facility], has been unable to assess past two days due to patient sleeping. "

## 2024-02-27 NOTE — W.PN.ID1
"Date of Service"~"-: "~"Date of Service:  February 27, 2024"~"Today's Communication"~"-: "~"- doxycycline x 7 days"~"- improve hygiene if safe/feasible - daily bathing and sheet changes"~"With advanced dementia with poor quality of life would be a candidate for hospice"~"Assessment / Plan"~"-: "~"Folliculitis"~"- raised, papular rash, no excoriations"~"- doxycycline x 7 days"~"- improve hygiene if safe/feasible - daily bathing and sheet changes"~"With advanced dementia with poor quality of life would be a candidate for hospice"~"Chief Complaint"~"-: Other (rash)"~"Subjective / Review of Systems"~"-: "~"Asked to reevaluate new rash"~"patient in house 39 days for dementia with behavioral disturbance"~"Patient remains afebrile, bp stable, no leukocytosis or L shift, no eos present, cr stable"~"Initial rash assessed as possible folliculitis with improvement with 5 day course of keflex.  Rash was L chest and L axilla"~"Wound care note reviewed - new pressure wounds are noted and likely due to immobility; also new papular rash in the dependant areas"~"sedated"~"Vital Signs / Physical Exam"~"Vital Signs"~"-: "~"Vital Signs"~"Temp	Pulse	Resp	BP	Pulse Ox"~" 97.8 F 	 63 	 14 	 145/79 	 96 "~" 02/27/24 07:30	 02/27/24 07:30	 02/27/24 07:30	 02/27/24 07:30	 02/27/24 07:34"~"Physical Exam"~"Constitutional: No Acute Distress"~"Cardiovascular: Regular Rate and S1/S2; Negative Murmur or Rub"~"Pulmonary: Clear and Symmetric; Negative Wheezes or Rales"~"Gastrointestinal: Soft, Non Tender, Non Distended and Normal Bowel Sounds"~"Skin: Warm, Dry and Rash (raised, follicular pattern on the back and posterior limbs); Negative Jaundice"~"Objective Data"~"Lab Data"~"-: "~"Lab Results"~"02/27/24 07:44 "~"02/27/24 07:44 "~"PT	 14.5 Sec (11.4-14.6)  	01/18/24  09:45    "~"INR	 1.11  	01/18/24  09:45    "~"Estimated Creat Clear	 77 ml/min 	02/27/24  07:44    "~"Total Bilirubin	 0.8 mg/dl (0.2-1.3)  	02/27/24  07:44    "~"AST	 22 U/L (17-59)  	02/27/24  07:44    "~"ALT	 14 U/L (0-50)  	02/27/24  07:44    "~"Alkaline Phosphatase	 56 U/L ()  	02/27/24  07:44    "~"Most recent labs reviewed."~"Micro Results: "~" 01/17/24 18:28	Influenza Types A & B (JASKARAN) - Final"~" Nasal Swab	   Negative for Influenza A & B, NAAT"~"	   Negative results must be combined with clinical observations"~"	   and patient history."~"	   Nucleic Acid Amplification test (NAAT)performed on the"~"	   [employer] NOW platform."

## 2024-02-28 VITALS — RESPIRATION RATE: 18 BRPM | SYSTOLIC BLOOD PRESSURE: 132 MMHG | DIASTOLIC BLOOD PRESSURE: 92 MMHG

## 2024-02-28 VITALS — RESPIRATION RATE: 16 BRPM | DIASTOLIC BLOOD PRESSURE: 94 MMHG | SYSTOLIC BLOOD PRESSURE: 182 MMHG

## 2024-02-28 RX ADMIN — ANTI-FUNGAL POWDER MICONAZOLE NITRATE TALC FREE 1 APPLIC: 1.42 POWDER TOPICAL at 19:08

## 2024-02-28 RX ADMIN — LORAZEPAM 0.5 MG: 0.5 TABLET ORAL at 20:45

## 2024-02-28 RX ADMIN — LORAZEPAM 0.5 MG: 0.5 TABLET ORAL at 11:31

## 2024-02-28 RX ADMIN — DIVALPROEX SODIUM 250 MG: 250 TABLET, DELAYED RELEASE ORAL at 20:45

## 2024-02-28 RX ADMIN — LORAZEPAM 0.5 MG: 0.5 TABLET ORAL at 02:40

## 2024-02-28 RX ADMIN — DOXYCYCLINE HYCLATE 100 MG: 100 CAPSULE ORAL at 09:18

## 2024-02-28 RX ADMIN — TAMSULOSIN HYDROCHLORIDE 0.4 MG: 0.4 CAPSULE ORAL at 20:45

## 2024-02-28 RX ADMIN — QUETIAPINE 50 MG: 25 TABLET, FILM COATED ORAL at 19:07

## 2024-02-28 RX ADMIN — LORAZEPAM 0.5 MG: 0.5 TABLET ORAL at 09:18

## 2024-02-28 RX ADMIN — DIVALPROEX SODIUM 250 MG: 250 TABLET, DELAYED RELEASE ORAL at 09:18

## 2024-02-28 RX ADMIN — LORAZEPAM 0.5 MG: 0.5 TABLET ORAL at 16:16

## 2024-02-28 RX ADMIN — HYDROPHOR 1 APPLIC: 42 OINTMENT TOPICAL at 09:20

## 2024-02-28 RX ADMIN — QUETIAPINE 50 MG: 25 TABLET, FILM COATED ORAL at 09:20

## 2024-02-28 RX ADMIN — QUETIAPINE 50 MG: 25 TABLET, FILM COATED ORAL at 11:31

## 2024-02-28 RX ADMIN — DOXYCYCLINE HYCLATE 100 MG: 100 CAPSULE ORAL at 19:07

## 2024-02-28 RX ADMIN — ASPIRIN 81 MG: 81 TABLET, COATED ORAL at 09:18

## 2024-02-28 RX ADMIN — LORAZEPAM 0.5 MG: 0.5 TABLET ORAL at 17:22

## 2024-02-28 RX ADMIN — Medication 5 MG: at 20:45

## 2024-02-28 RX ADMIN — ANTI-FUNGAL POWDER MICONAZOLE NITRATE TALC FREE 1 APPLIC: 1.42 POWDER TOPICAL at 09:20

## 2024-02-28 RX ADMIN — QUETIAPINE 50 MG: 25 TABLET, FILM COATED ORAL at 16:16

## 2024-02-28 RX ADMIN — DIVALPROEX SODIUM 250 MG: 250 TABLET, DELAYED RELEASE ORAL at 16:16

## 2024-02-28 NOTE — W.PN.ID1
"Date of Service"~"-: "~"Date of Service:  February 28, 2024"~"Today's Communication"~"-: "~"continue doxy"~"Assessment / Plan"~"-: "~"Folliculitis"~"- raised, papular rash, no excoriations - improving"~"- doxycycline x 7 days "~"- improve hygiene if safe/feasible - daily bathing and sheet changes"~"With advanced dementia with poor quality of life would be a candidate for hospice"~"Chief Complaint"~"-: Other (rash)"~"Subjective / Review of Systems"~"-: "~"afebrile"~"bp stable"~"no labs today"~"sedated"~"folliculitis improving"~"Vital Signs / Physical Exam"~"Vital Signs"~"-: "~"Vital Signs"~"Temp	Pulse	Resp	BP	Pulse Ox"~" 98.7 F 	 82 	 16 	 182/94 	 96 "~" 02/27/24 20:39	 02/28/24 09:25	 02/28/24 09:25	 02/28/24 09:25	 02/28/24 09:25"~"Physical Exam"~"Constitutional: No Acute Distress"~"Cardiovascular: Regular Rate and S1/S2; Negative Murmur or Rub"~"Pulmonary: Clear and Symmetric; Negative Wheezes or Rales"~"Gastrointestinal: Soft, Non Tender, Non Distended and Normal Bowel Sounds"~"Skin: Warm, Dry and Rash (folliculitis improving); Negative Jaundice"~"Objective Data"~"Lab Data"~"-: "~"Lab Results"~"02/27/24 07:44 "~"02/27/24 07:44 "~"PT	 14.5 Sec (11.4-14.6)  	01/18/24  09:45    "~"INR	 1.11  	01/18/24  09:45    "~"Estimated Creat Clear	 77 ml/min 	02/27/24  07:44    "~"Total Bilirubin	 0.8 mg/dl (0.2-1.3)  	02/27/24  07:44    "~"AST	 22 U/L (17-59)  	02/27/24  07:44    "~"ALT	 14 U/L (0-50)  	02/27/24  07:44    "~"Alkaline Phosphatase	 56 U/L ()  	02/27/24  07:44    "~"Most recent labs reviewed."~"Micro Results: "~" 01/17/24 18:28	Influenza Types A & B (JASKARAN) - Final"~" Nasal Swab	   Negative for Influenza A & B, NAAT"~"	   Negative results must be combined with clinical observations"~"	   and patient history."~"	   Nucleic Acid Amplification test (NAAT)performed on the"~"	   [employer] NOW platform."

## 2024-02-28 NOTE — W.PN.UPDATE
"Update Note"~"Progress Note Update"~"-: "~"patient seen chart reviewed.  spoke with one to one who reports that the patient has two modes....sedated or agitated . when he is agitated he is at risk for aggressing upon others and falling.  he was sleeping when i saw him this am and waking him "~"up only results in my experience in his becoming agitated.  no changes were made in his medication. there have been many suggestions as to how to get mr sprague off one to one but i don't see any of them mitigating the fall risk issue. hospice has "~"been suggested but he has no serious medical issues at this moment for which we could withhold rx.i will speak to hospice to see what they might offer.  noted he has been placed on an antibiotic for skin rash which is very longstanding by this "~"point.  will continue to follow"

## 2024-02-29 VITALS — SYSTOLIC BLOOD PRESSURE: 135 MMHG | RESPIRATION RATE: 18 BRPM | DIASTOLIC BLOOD PRESSURE: 98 MMHG

## 2024-02-29 VITALS — SYSTOLIC BLOOD PRESSURE: 135 MMHG | DIASTOLIC BLOOD PRESSURE: 98 MMHG | RESPIRATION RATE: 18 BRPM

## 2024-02-29 VITALS — DIASTOLIC BLOOD PRESSURE: 98 MMHG | RESPIRATION RATE: 18 BRPM | SYSTOLIC BLOOD PRESSURE: 128 MMHG

## 2024-02-29 VITALS — DIASTOLIC BLOOD PRESSURE: 85 MMHG | SYSTOLIC BLOOD PRESSURE: 172 MMHG | RESPIRATION RATE: 18 BRPM

## 2024-02-29 RX ADMIN — QUETIAPINE 50 MG: 25 TABLET, FILM COATED ORAL at 12:04

## 2024-02-29 RX ADMIN — Medication 5 MG: at 19:24

## 2024-02-29 RX ADMIN — ANTI-FUNGAL POWDER MICONAZOLE NITRATE TALC FREE 1 APPLIC: 1.42 POWDER TOPICAL at 19:25

## 2024-02-29 RX ADMIN — DOXYCYCLINE HYCLATE 100 MG: 100 CAPSULE ORAL at 07:15

## 2024-02-29 RX ADMIN — CETIRIZINE HYDROCHLORIDE 10 MG: 10 TABLET ORAL at 13:42

## 2024-02-29 RX ADMIN — ANTI-FUNGAL POWDER MICONAZOLE NITRATE TALC FREE 1 APPLIC: 1.42 POWDER TOPICAL at 07:15

## 2024-02-29 RX ADMIN — DOXYCYCLINE HYCLATE 100 MG: 100 CAPSULE ORAL at 19:25

## 2024-02-29 RX ADMIN — LORAZEPAM 0.5 MG: 0.5 TABLET ORAL at 21:42

## 2024-02-29 RX ADMIN — DIVALPROEX SODIUM 250 MG: 125 CAPSULE, COATED PELLETS ORAL at 17:14

## 2024-02-29 RX ADMIN — DIVALPROEX SODIUM 250 MG: 125 CAPSULE, COATED PELLETS ORAL at 21:42

## 2024-02-29 RX ADMIN — ASPIRIN 81 MG: 81 TABLET, COATED ORAL at 07:15

## 2024-02-29 RX ADMIN — QUETIAPINE 50 MG: 25 TABLET, FILM COATED ORAL at 21:42

## 2024-02-29 RX ADMIN — HYDROPHOR 1 APPLIC: 42 OINTMENT TOPICAL at 11:49

## 2024-02-29 RX ADMIN — QUETIAPINE 50 MG: 25 TABLET, FILM COATED ORAL at 07:15

## 2024-02-29 RX ADMIN — TAMSULOSIN HYDROCHLORIDE 0.4 MG: 0.4 CAPSULE ORAL at 19:25

## 2024-02-29 RX ADMIN — DIVALPROEX SODIUM 250 MG: 250 TABLET, DELAYED RELEASE ORAL at 07:15

## 2024-02-29 RX ADMIN — LORAZEPAM 0.5 MG: 0.5 TABLET ORAL at 16:20

## 2024-02-29 RX ADMIN — QUETIAPINE 50 MG: 25 TABLET, FILM COATED ORAL at 16:19

## 2024-02-29 RX ADMIN — DIVALPROEX SODIUM 250 MG: 125 CAPSULE, COATED PELLETS ORAL at 13:42

## 2024-02-29 RX ADMIN — LORAZEPAM 0.5 MG: 0.5 TABLET ORAL at 07:15

## 2024-02-29 NOTE — PTCARENOTE
"Pt with repeated attempts to stand; assisted x1. Ambulated to bathroom with nursing in close proximity. Pt stumbled heading back to bed and unable to regain footing; assisted slowly to chair without injury. Pt became frustrated. Yelling, 'Fucking "~"useless!' Emotional support given. Assisted back to bed and warm blanket provided."

## 2024-02-29 NOTE — W.PN.ID1
"Date of Service"~"-: "~"Date of Service:  February 29, 2024"~"Today's Communication"~"-: "~"- agree with emollients for xerosis which may resolve itch"~"- add cetirizine"~"- raised, papular rash, no excoriations - overall improving"~"- c/w doxycycline x 7 days total - pruritus not likely to be due to doxycycline"~"Assessment / Plan"~"-: "~"Folliculitis"~"Pruritus - likely due to xerosis"~"Advanced dementia with frequent falls "~"- agree with emollients for xerosis which may resolve itch"~"- add cetirizine"~"- raised, papular rash, no excoriations - overall improving"~"- c/w doxycycline x 7 days total - pruritus not likely to be due to doxycycline"~"- improve hygiene if safe/feasible - daily bathing and sheet changes"~"Chief Complaint"~"-: Other (rash)"~"Subjective / Review of Systems"~"-: "~"afebrile"~"bp stable"~"pruritus noted by psychiatry - agree with treating xerosis"~"Vital Signs / Physical Exam"~"Vital Signs"~"-: "~"Vital Signs"~"Temp	Pulse	Resp	BP	Pulse Ox"~" 97.8 F 	 75 	 18 	 128/98 	 97 "~" 02/29/24 07:41	 02/29/24 07:41	 02/29/24 07:41	 02/29/24 07:41	 02/29/24 08:10"~"Physical Exam"~"Constitutional: No Acute Distress and Chronically Ill"~"Cardiovascular: Regular Rate and S1/S2; Negative Murmur or Rub"~"Pulmonary: Clear and Symmetric; Negative Wheezes or Rales"~"Gastrointestinal: Soft, Non Tender, Non Distended and Normal Bowel Sounds"~"Skin: Warm, Dry and Rash (much less folliculitis on my exam); Negative Jaundice"~"Objective Data"~"Lab Data"~"-: "~"Lab Results"~"02/27/24 07:44 "~"02/27/24 07:44 "~"PT	 14.5 Sec (11.4-14.6)  	01/18/24  09:45    "~"INR	 1.11  	01/18/24  09:45    "~"Estimated Creat Clear	 77 ml/min 	02/27/24  07:44    "~"Total Bilirubin	 0.8 mg/dl (0.2-1.3)  	02/27/24  07:44    "~"AST	 22 U/L (17-59)  	02/27/24  07:44    "~"ALT	 14 U/L (0-50)  	02/27/24  07:44    "~"Alkaline Phosphatase	 56 U/L ()  	02/27/24  07:44    "~"Most recent labs reviewed."~"Micro Results: "~" 01/17/24 18:28	Influenza Types A & B (JASKARAN) - Final"~" Nasal Swab	   Negative for Influenza A & B, NAAT"~"	   Negative results must be combined with clinical observations"~"	   and patient history."~"	   Nucleic Acid Amplification test (NAAT)performed on the"~"	   [employer] platform."

## 2024-02-29 NOTE — W.PN.UPDATE
"Update Note"~"Progress Note Update"~"-: "~"patient seen chart reviewed. discussed w nursing.  mr sprague was sitting at the edge of the bed scratching vigorously. noted he is now on doxycycline for ?rash.  this rash present for some time and unclear as to etiology. it does seem a little bit "~"better but still clearly irritating for him.  asked nursing to use aquaphor very liberally.  nursing reports that patient is chewing the depakote.  have changed to sprinkle caps and increased dose to 250 mg qid level is low 60's so still some room "~"for increase in the hope that it may help mr sprague to settle.  patient did receive three prn's of ativan  yesterday hence the increase in depakote.  did not make any other med changes. will follow"

## 2024-02-29 NOTE — PTCARENOTE
"Pt stood with minimal assistance. Ambulated to bathroom. Attempted to urinate in toilet, but voided on floor. Evidence of incontinence, new brief applied. Ambulated back to chair briefly before returning to bed. Pt napped and awoke startled, "~"attempting to stand. Unable to stand despite maximum assistance x1. No evidence of incontinence and not able to follow direction to void into urinal. Pt ate lunch with wife at bedside."

## 2024-03-01 VITALS — RESPIRATION RATE: 19 BRPM | DIASTOLIC BLOOD PRESSURE: 89 MMHG | SYSTOLIC BLOOD PRESSURE: 174 MMHG

## 2024-03-01 VITALS — SYSTOLIC BLOOD PRESSURE: 159 MMHG | DIASTOLIC BLOOD PRESSURE: 82 MMHG | RESPIRATION RATE: 16 BRPM

## 2024-03-01 VITALS — DIASTOLIC BLOOD PRESSURE: 92 MMHG | RESPIRATION RATE: 18 BRPM | SYSTOLIC BLOOD PRESSURE: 165 MMHG

## 2024-03-01 RX ADMIN — TAMSULOSIN HYDROCHLORIDE 0.4 MG: 0.4 CAPSULE ORAL at 21:45

## 2024-03-01 RX ADMIN — CETIRIZINE HYDROCHLORIDE 10 MG: 10 TABLET ORAL at 07:09

## 2024-03-01 RX ADMIN — LORAZEPAM 0.5 MG: 0.5 TABLET ORAL at 21:41

## 2024-03-01 RX ADMIN — ASPIRIN 81 MG: 81 TABLET, COATED ORAL at 07:09

## 2024-03-01 RX ADMIN — DIVALPROEX SODIUM 250 MG: 125 CAPSULE, COATED PELLETS ORAL at 07:09

## 2024-03-01 RX ADMIN — DIVALPROEX SODIUM 250 MG: 125 CAPSULE, COATED PELLETS ORAL at 21:41

## 2024-03-01 RX ADMIN — LORAZEPAM 0.5 MG: 0.5 TABLET ORAL at 16:22

## 2024-03-01 RX ADMIN — ANTI-FUNGAL POWDER MICONAZOLE NITRATE TALC FREE 1 APPLIC: 1.42 POWDER TOPICAL at 07:09

## 2024-03-01 RX ADMIN — LORAZEPAM 0.5 MG: 0.5 TABLET ORAL at 07:09

## 2024-03-01 RX ADMIN — QUETIAPINE 50 MG: 25 TABLET, FILM COATED ORAL at 07:08

## 2024-03-01 RX ADMIN — DIVALPROEX SODIUM 250 MG: 125 CAPSULE, COATED PELLETS ORAL at 18:29

## 2024-03-01 RX ADMIN — HYDROPHOR 1 APPLIC: 42 OINTMENT TOPICAL at 12:11

## 2024-03-01 RX ADMIN — DIVALPROEX SODIUM 250 MG: 125 CAPSULE, COATED PELLETS ORAL at 12:12

## 2024-03-01 RX ADMIN — DOXYCYCLINE HYCLATE 100 MG: 100 CAPSULE ORAL at 07:08

## 2024-03-01 RX ADMIN — Medication 5 MG: at 21:41

## 2024-03-01 RX ADMIN — QUETIAPINE 50 MG: 25 TABLET, FILM COATED ORAL at 12:12

## 2024-03-01 RX ADMIN — QUETIAPINE 50 MG: 25 TABLET, FILM COATED ORAL at 21:45

## 2024-03-01 RX ADMIN — QUETIAPINE 50 MG: 25 TABLET, FILM COATED ORAL at 16:22

## 2024-03-01 RX ADMIN — ANTI-FUNGAL POWDER MICONAZOLE NITRATE TALC FREE 1 APPLIC: 1.42 POWDER TOPICAL at 21:45

## 2024-03-01 RX ADMIN — DOXYCYCLINE HYCLATE 100 MG: 100 CAPSULE ORAL at 21:42

## 2024-03-01 RX ADMIN — DIVALPROEX SODIUM: 125 CAPSULE, COATED PELLETS ORAL at 18:06

## 2024-03-01 NOTE — CM
"Addendum entered by Sandee Nguyen  03/01/24 10:08: "~"additional referrals sent. "~"Original Note:"~"Bed search continues."~"Patient back on 1:1. "~"Plan: placement when stable and bed available. "

## 2024-03-01 NOTE — W.PN.UPDATE
"Update Note"~"Progress Note Update"~"-: "~"patient seen chart reviewed.  discussed with nursing. mr sprague was resting comfortably when seen. mental status has not and likely will not change.  on a + note he has not had prns since around 5 pm on the 28th so hopefully we are heading in the "~"right direction. ID note appreciated re rash.  will continue to follow have ordered depakote level for tuesday am . "

## 2024-03-01 NOTE — PTCARENOTE
"Pt sleeping intermittently. Rouses with stimuli. Restless while awake with intent to stand. Assisted OOB to chain with heavy assist x1. Unsteady gait. Close proximity required for ambulation. Incontinent of bladder. Bed bath performed and linens "~"changed. No bowel movement observed.  		"

## 2024-03-01 NOTE — W.PN.ID1
"Date of Service"~"-: "~"Date of Service:  March 1, 2024"~"Today's Communication"~"-: "~"ID service will no longer actively follow this patient please recall for further questions  "~"Assessment / Plan"~"-: "~"Folliculitis"~"Pruritus - likely due to xerosis"~"Advanced dementia with frequent falls "~"- agree with emollients for xerosis which may resolve itch"~"- add cetirizine"~"- raised, papular rash, no excoriations - overall improving"~"- c/w doxycycline x 7 days total - pruritus not likely to be due to doxycycline"~"- improve hygiene if safe/feasible - daily bathing and sheet changes"~"ID service will no longer actively follow this patient please recall for further questions  "~"Chief Complaint"~"-: Other (rash)"~"Subjective / Review of Systems"~"-: "~"afebrile"~"bp stable"~"rash continues to resolve"~"Vital Signs / Physical Exam"~"Vital Signs"~"-: "~"Vital Signs"~"Temp	Pulse	Resp	BP	Pulse Ox"~" 97.6 F 	 64 	 16 	 159/82 	 96 "~" 03/01/24 08:15	 03/01/24 08:15	 03/01/24 08:15	 03/01/24 08:15	 03/01/24 08:25"~"Physical Exam"~"Constitutional: No Acute Distress"~"Cardiovascular: Regular Rate and S1/S2; Negative Murmur or Rub"~"Pulmonary: Symmetric and Non Labored"~"Gastrointestinal: Non Tender and Non Distended"~"Skin: Warm, Dry and Rash (minimal folliculitis - nearly resolved); Negative Jaundice"~"Objective Data"~"Lab Data"~"-: "~"Lab Results"~"02/27/24 07:44 "~"02/27/24 07:44 "~"PT	 14.5 Sec (11.4-14.6)  	01/18/24  09:45    "~"INR	 1.11  	01/18/24  09:45    "~"Estimated Creat Clear	 77 ml/min 	02/27/24  07:44    "~"Total Bilirubin	 0.8 mg/dl (0.2-1.3)  	02/27/24  07:44    "~"AST	 22 U/L (17-59)  	02/27/24  07:44    "~"ALT	 14 U/L (0-50)  	02/27/24  07:44    "~"Alkaline Phosphatase	 56 U/L ()  	02/27/24  07:44    "~"Most recent labs reviewed."~"Micro Results: "~" 01/17/24 18:28	Influenza Types A & B (JASKARAN) - Final"~" Nasal Swab	   Negative for Influenza A & B, NAAT"~"	   Negative results must be combined with clinical observations"~"	   and patient history."~"	   Nucleic Acid Amplification test (NAAT)performed on the"~"	   [employer] platform."~"Care Review"~"Plan reviewed with: Nurse (we are in agreement about rash resolving)"

## 2024-03-02 VITALS — RESPIRATION RATE: 18 BRPM | DIASTOLIC BLOOD PRESSURE: 79 MMHG | SYSTOLIC BLOOD PRESSURE: 141 MMHG

## 2024-03-02 VITALS — RESPIRATION RATE: 18 BRPM | DIASTOLIC BLOOD PRESSURE: 66 MMHG | SYSTOLIC BLOOD PRESSURE: 89 MMHG

## 2024-03-02 VITALS — DIASTOLIC BLOOD PRESSURE: 93 MMHG | RESPIRATION RATE: 18 BRPM | SYSTOLIC BLOOD PRESSURE: 163 MMHG

## 2024-03-02 VITALS — RESPIRATION RATE: 18 BRPM | SYSTOLIC BLOOD PRESSURE: 153 MMHG | DIASTOLIC BLOOD PRESSURE: 93 MMHG

## 2024-03-02 RX ADMIN — HYDROPHOR 1 APPLIC: 42 OINTMENT TOPICAL at 07:29

## 2024-03-02 RX ADMIN — QUETIAPINE 50 MG: 25 TABLET, FILM COATED ORAL at 07:35

## 2024-03-02 RX ADMIN — ASPIRIN 81 MG: 81 TABLET, COATED ORAL at 07:28

## 2024-03-02 RX ADMIN — ANTI-FUNGAL POWDER MICONAZOLE NITRATE TALC FREE 1 APPLIC: 1.42 POWDER TOPICAL at 07:29

## 2024-03-02 RX ADMIN — QUETIAPINE 50 MG: 25 TABLET, FILM COATED ORAL at 21:29

## 2024-03-02 RX ADMIN — LORAZEPAM 0.5 MG: 0.5 TABLET ORAL at 21:27

## 2024-03-02 RX ADMIN — QUETIAPINE 50 MG: 25 TABLET, FILM COATED ORAL at 11:22

## 2024-03-02 RX ADMIN — DIVALPROEX SODIUM 250 MG: 125 CAPSULE, COATED PELLETS ORAL at 17:31

## 2024-03-02 RX ADMIN — DIVALPROEX SODIUM 250 MG: 125 CAPSULE, COATED PELLETS ORAL at 21:27

## 2024-03-02 RX ADMIN — DOXYCYCLINE HYCLATE 100 MG: 100 CAPSULE ORAL at 07:27

## 2024-03-02 RX ADMIN — LORAZEPAM 0.5 MG: 0.5 TABLET ORAL at 16:02

## 2024-03-02 RX ADMIN — ANTI-FUNGAL POWDER MICONAZOLE NITRATE TALC FREE 1 APPLIC: 1.42 POWDER TOPICAL at 21:29

## 2024-03-02 RX ADMIN — DIVALPROEX SODIUM 250 MG: 125 CAPSULE, COATED PELLETS ORAL at 07:28

## 2024-03-02 RX ADMIN — TAMSULOSIN HYDROCHLORIDE 0.4 MG: 0.4 CAPSULE ORAL at 21:26

## 2024-03-02 RX ADMIN — Medication 5 MG: at 21:26

## 2024-03-02 RX ADMIN — LORAZEPAM 0.5 MG: 0.5 TABLET ORAL at 07:28

## 2024-03-02 RX ADMIN — DIVALPROEX SODIUM 250 MG: 125 CAPSULE, COATED PELLETS ORAL at 11:25

## 2024-03-02 RX ADMIN — QUETIAPINE 50 MG: 25 TABLET, FILM COATED ORAL at 16:02

## 2024-03-02 RX ADMIN — CETIRIZINE HYDROCHLORIDE 10 MG: 10 TABLET ORAL at 07:27

## 2024-03-02 RX ADMIN — DOXYCYCLINE HYCLATE 100 MG: 100 CAPSULE ORAL at 21:26

## 2024-03-02 NOTE — PTCARENOTE
"Received pt. at 0700, pt. sleeping. Pt. woke at 0720, immediately tried to get OOB. Pt. incontinent of urine. This nurse and PCT changed pt. brief, pt. incorporative, pushed back on and grabbed staff. Pt. heavy assist to chair, unsteady on feet. Pt. "~"relaxed in chair for 30 minutes, then attempted to get up. Pt. assisted back to bed. Pt. sleeping at this time. "

## 2024-03-02 NOTE — W.PN.UPDATE
"Update Note"~"Progress Note Update"~"-: "~"Patient seen for follow-up.  Known to me from a previous admission and chart reviewed.  He is asleep in bed; had been agitated earlier per staff.  A valproate level was 64.4 on 2/27/24.  He continues on Seroquel 50 mg. QID, Ativan, Depakote and "~"melatonin.  No changes in medications.  Psych will follow."

## 2024-03-02 NOTE — PTCARENOTE
"Pt. awake from [id number], during this time wife visited and fed pt. a late lunch. Pt. cooperative during this time with no attempts to get OOB. Pt. fell back to sleep at 1630 and woke again at 1730. Pt. incontinent of urine at this time. This nurse "~"and PCT changed pt. brief, during personal care pt. pushed back against staff. Pt. dinner tray arrived at 1745, pt. agreeable to eat dinner at this time. This nurse assisted pt. with meal. Oral care provide post meal. Pt. fell back to sleep at 1810. "~"Pt. did not attempt to get OOB during this wake time. "

## 2024-03-02 NOTE — PTCARENOTE
"Pt. woke up at 11:15, immediately attempted to get OOB. Pt. incontinent of urine. This nurse and PCT changed pt. brief. Pt. uncooperative, still attempting to get OOB, pushing back on/grabbing staff and swung fist towards staff, missed and punched "~"the bedside rail. Pt. sat on bedside, this nurse fed pt. as pt. unable to feed self at this time. Pt. then wanting to get OOB, this nurse tried to direct pt. to chair, attempts unsuccessful. Pt. stated to this nurse, 'you're bitchy'. This nurse "~"called for assistance from another staff nurse at this time for help getting pt. back to bed. Pt. settled in bed at this time, falling asleep. "

## 2024-03-03 VITALS — SYSTOLIC BLOOD PRESSURE: 169 MMHG | DIASTOLIC BLOOD PRESSURE: 84 MMHG | RESPIRATION RATE: 16 BRPM

## 2024-03-03 VITALS — DIASTOLIC BLOOD PRESSURE: 90 MMHG | RESPIRATION RATE: 18 BRPM | SYSTOLIC BLOOD PRESSURE: 153 MMHG

## 2024-03-03 RX ADMIN — QUETIAPINE 50 MG: 25 TABLET, FILM COATED ORAL at 16:52

## 2024-03-03 RX ADMIN — DIVALPROEX SODIUM 250 MG: 125 CAPSULE, COATED PELLETS ORAL at 12:37

## 2024-03-03 RX ADMIN — QUETIAPINE 50 MG: 25 TABLET, FILM COATED ORAL at 07:50

## 2024-03-03 RX ADMIN — DIVALPROEX SODIUM 250 MG: 125 CAPSULE, COATED PELLETS ORAL at 07:51

## 2024-03-03 RX ADMIN — Medication 5 MG: at 21:40

## 2024-03-03 RX ADMIN — DOXYCYCLINE HYCLATE 100 MG: 100 CAPSULE ORAL at 19:58

## 2024-03-03 RX ADMIN — QUETIAPINE 50 MG: 25 TABLET, FILM COATED ORAL at 12:37

## 2024-03-03 RX ADMIN — ANTI-FUNGAL POWDER MICONAZOLE NITRATE TALC FREE 1 APPLIC: 1.42 POWDER TOPICAL at 19:57

## 2024-03-03 RX ADMIN — CALAMINE 8% AND ZINC OXIDE 8% 5 ML: 160 LOTION TOPICAL at 23:00

## 2024-03-03 RX ADMIN — HYDROPHOR 1 APPLIC: 42 OINTMENT TOPICAL at 07:52

## 2024-03-03 RX ADMIN — DIVALPROEX SODIUM 250 MG: 125 CAPSULE, COATED PELLETS ORAL at 21:42

## 2024-03-03 RX ADMIN — LORAZEPAM 0.5 MG: 0.5 TABLET ORAL at 07:51

## 2024-03-03 RX ADMIN — TAMSULOSIN HYDROCHLORIDE 0.4 MG: 0.4 CAPSULE ORAL at 19:59

## 2024-03-03 RX ADMIN — LORAZEPAM 0.5 MG: 0.5 TABLET ORAL at 16:52

## 2024-03-03 RX ADMIN — ANTI-FUNGAL POWDER MICONAZOLE NITRATE TALC FREE 1 APPLIC: 1.42 POWDER TOPICAL at 07:52

## 2024-03-03 RX ADMIN — DIVALPROEX SODIUM 250 MG: 125 CAPSULE, COATED PELLETS ORAL at 16:51

## 2024-03-03 RX ADMIN — LORAZEPAM 0.5 MG: 0.5 TABLET ORAL at 21:42

## 2024-03-03 RX ADMIN — CETIRIZINE HYDROCHLORIDE 10 MG: 10 TABLET ORAL at 07:50

## 2024-03-03 RX ADMIN — DOXYCYCLINE HYCLATE 100 MG: 100 CAPSULE ORAL at 07:51

## 2024-03-03 RX ADMIN — ASPIRIN 81 MG: 81 TABLET, COATED ORAL at 07:50

## 2024-03-03 NOTE — W.PN.UPDATE
"Update Note"~"Progress Note Update"~"-: "~"Patient with advanced dementia and behavioral disturbance seen for follow-up. He was again asleep peacefully.  While agitation seems to be controlled now, he is so sedated he has not been eating and sleeps most of the day.  "~"I will reduce Seroquel from 50 mg. QID to 50 mg. TID. He has labs ordered by hospitalist tomorrow and valproate level ordered by Dr. Dunne for 3/5/24. "~"Psychiatry will follow.   "

## 2024-03-04 VITALS — RESPIRATION RATE: 18 BRPM | SYSTOLIC BLOOD PRESSURE: 171 MMHG | DIASTOLIC BLOOD PRESSURE: 92 MMHG

## 2024-03-04 VITALS — SYSTOLIC BLOOD PRESSURE: 170 MMHG | RESPIRATION RATE: 18 BRPM | DIASTOLIC BLOOD PRESSURE: 92 MMHG

## 2024-03-04 VITALS — RESPIRATION RATE: 16 BRPM | DIASTOLIC BLOOD PRESSURE: 108 MMHG | SYSTOLIC BLOOD PRESSURE: 155 MMHG

## 2024-03-04 VITALS — DIASTOLIC BLOOD PRESSURE: 91 MMHG | SYSTOLIC BLOOD PRESSURE: 158 MMHG

## 2024-03-04 LAB
ALBUMIN SERPL-MCNC: 3.9 G/DL (ref 3.5–5)
ALP SERPL-CCNC: 50 U/L (ref 38–126)
ALT SERPL-CCNC: 19 U/L (ref 0–50)
AST SERPL-CCNC: 30 U/L (ref 17–59)
BUN SERPL-MCNC: 20 MG/DL (ref 9–20)
CALCIUM SERPL-MCNC: 9.6 MG/DL (ref 8.4–10.2)
CHLORIDE SERPL-SCNC: 104 MMOL/L (ref 98–107)
CO2 SERPL-SCNC: 29 MMOL/L (ref 22–30)
EGFR: > 60
ERYTHROCYTE [DISTWIDTH] IN BLOOD BY AUTOMATED COUNT: 13.1 % (ref 11.5–14.5)
ESTIMATED CREATININE CLEARANCE: 77 ML/MIN
GLUCOSE SERPL-MCNC: 83 MG/DL (ref 70–99)
HCT VFR BLD AUTO: 44.8 % (ref 39–52)
HGB BLD-MCNC: 15.5 G/DL (ref 13–18)
MAGNESIUM SERPL-MCNC: 2.2 MG/DL (ref 1.6–2.3)
MCHC RBC AUTO-ENTMCNC: 34.6 G/DL (ref 33–37)
MCV RBC AUTO: 92.2 FL (ref 80–94)
NRBC BLD AUTO-RTO: 0 %
PLATELET # BLD AUTO: 105 10^3/UL (ref 130–400)
POTASSIUM SERPL-SCNC: 4.1 MMOL/L (ref 3.5–5.1)
PROT SERPL-MCNC: 6.6 G/DL (ref 6.3–8.2)
SODIUM SERPL-SCNC: 140 MMOL/L (ref 135–145)

## 2024-03-04 RX ADMIN — DIVALPROEX SODIUM 250 MG: 125 CAPSULE, COATED PELLETS ORAL at 07:01

## 2024-03-04 RX ADMIN — TAMSULOSIN HYDROCHLORIDE 0.4 MG: 0.4 CAPSULE ORAL at 20:22

## 2024-03-04 RX ADMIN — DOXYCYCLINE HYCLATE 100 MG: 100 CAPSULE ORAL at 07:02

## 2024-03-04 RX ADMIN — QUETIAPINE 50 MG: 25 TABLET, FILM COATED ORAL at 12:41

## 2024-03-04 RX ADMIN — CETIRIZINE HYDROCHLORIDE 10 MG: 10 TABLET ORAL at 07:02

## 2024-03-04 RX ADMIN — QUETIAPINE 50 MG: 25 TABLET, FILM COATED ORAL at 07:01

## 2024-03-04 RX ADMIN — HYDROPHOR 1 APPLIC: 42 OINTMENT TOPICAL at 07:04

## 2024-03-04 RX ADMIN — Medication 5 MG: at 20:22

## 2024-03-04 RX ADMIN — DIVALPROEX SODIUM 250 MG: 125 CAPSULE, COATED PELLETS ORAL at 12:41

## 2024-03-04 RX ADMIN — LORAZEPAM 0.5 MG: 0.5 TABLET ORAL at 16:11

## 2024-03-04 RX ADMIN — DIVALPROEX SODIUM 250 MG: 125 CAPSULE, COATED PELLETS ORAL at 20:23

## 2024-03-04 RX ADMIN — DIVALPROEX SODIUM 250 MG: 125 CAPSULE, COATED PELLETS ORAL at 17:41

## 2024-03-04 RX ADMIN — LORAZEPAM 0.5 MG: 0.5 TABLET ORAL at 20:23

## 2024-03-04 RX ADMIN — LORAZEPAM 0.5 MG: 0.5 TABLET ORAL at 07:01

## 2024-03-04 RX ADMIN — ANTI-FUNGAL POWDER MICONAZOLE NITRATE TALC FREE 1 APPLIC: 1.42 POWDER TOPICAL at 20:23

## 2024-03-04 RX ADMIN — ANTI-FUNGAL POWDER MICONAZOLE NITRATE TALC FREE 1 APPLIC: 1.42 POWDER TOPICAL at 07:03

## 2024-03-04 RX ADMIN — CARVEDILOL 3.12 MG: 3.12 TABLET, FILM COATED ORAL at 20:22

## 2024-03-04 RX ADMIN — DOXYCYCLINE HYCLATE 100 MG: 100 CAPSULE ORAL at 20:23

## 2024-03-04 RX ADMIN — ASPIRIN 81 MG: 81 TABLET, COATED ORAL at 07:00

## 2024-03-04 RX ADMIN — QUETIAPINE 50 MG: 25 TABLET, FILM COATED ORAL at 16:11

## 2024-03-04 RX ADMIN — CALAMINE 8% AND ZINC OXIDE 8% 5 ML: 160 LOTION TOPICAL at 06:25

## 2024-03-04 NOTE — PTCARENOTE
"Pt drowsy. Sleeping intermittently but attempts to stand when rouses awake. Pt unable to stand with max assistance x1. Garbled speech. Full bed bath performed with shampoo cap and face shaved. Linens changed. No episodes of aggression observed this "~"shift.  "

## 2024-03-04 NOTE — CM
" reviewed patient's chart and provided updated clinical to admissions at [facility], [facility], and [facility]. "~"Plan; Continuing to look for skilled placement for patient."

## 2024-03-04 NOTE — PTCARENOTE
At beginning of shift, pt was uncooperative w/care and turning, requiring 3 people due to his resistance and grabbing @ staff. Since 2100 has been calm and has not resisted incontinence care and turning. Slept most of night.

## 2024-03-05 VITALS — RESPIRATION RATE: 20 BRPM | SYSTOLIC BLOOD PRESSURE: 116 MMHG | DIASTOLIC BLOOD PRESSURE: 112 MMHG

## 2024-03-05 VITALS — DIASTOLIC BLOOD PRESSURE: 86 MMHG | SYSTOLIC BLOOD PRESSURE: 163 MMHG

## 2024-03-05 VITALS — SYSTOLIC BLOOD PRESSURE: 143 MMHG | DIASTOLIC BLOOD PRESSURE: 82 MMHG | RESPIRATION RATE: 18 BRPM

## 2024-03-05 VITALS — SYSTOLIC BLOOD PRESSURE: 128 MMHG | RESPIRATION RATE: 18 BRPM | DIASTOLIC BLOOD PRESSURE: 72 MMHG

## 2024-03-05 LAB — VALPROATE SERPL-MCNC: 85.6 UG/ML (ref 50–120)

## 2024-03-05 RX ADMIN — LORAZEPAM 0.5 MG: 0.5 TABLET ORAL at 15:56

## 2024-03-05 RX ADMIN — TAMSULOSIN HYDROCHLORIDE 0.4 MG: 0.4 CAPSULE ORAL at 21:36

## 2024-03-05 RX ADMIN — ASPIRIN 81 MG: 81 TABLET, COATED ORAL at 08:55

## 2024-03-05 RX ADMIN — HYDROPHOR 1 APPLIC: 42 OINTMENT TOPICAL at 08:56

## 2024-03-05 RX ADMIN — Medication 5 MG: at 21:36

## 2024-03-05 RX ADMIN — QUETIAPINE 50 MG: 25 TABLET, FILM COATED ORAL at 13:28

## 2024-03-05 RX ADMIN — CETIRIZINE HYDROCHLORIDE 10 MG: 10 TABLET ORAL at 08:55

## 2024-03-05 RX ADMIN — QUETIAPINE 50 MG: 25 TABLET, FILM COATED ORAL at 18:00

## 2024-03-05 RX ADMIN — ANTI-FUNGAL POWDER MICONAZOLE NITRATE TALC FREE 1 APPLIC: 1.42 POWDER TOPICAL at 21:37

## 2024-03-05 RX ADMIN — LORAZEPAM 0.5 MG: 0.5 TABLET ORAL at 21:38

## 2024-03-05 RX ADMIN — ANTI-FUNGAL POWDER MICONAZOLE NITRATE TALC FREE 1 APPLIC: 1.42 POWDER TOPICAL at 08:56

## 2024-03-05 RX ADMIN — DIVALPROEX SODIUM 250 MG: 125 CAPSULE, COATED PELLETS ORAL at 21:36

## 2024-03-05 RX ADMIN — DIVALPROEX SODIUM 250 MG: 125 CAPSULE, COATED PELLETS ORAL at 08:55

## 2024-03-05 RX ADMIN — DIVALPROEX SODIUM 250 MG: 125 CAPSULE, COATED PELLETS ORAL at 17:59

## 2024-03-05 RX ADMIN — CARVEDILOL 3.12 MG: 3.12 TABLET, FILM COATED ORAL at 08:55

## 2024-03-05 RX ADMIN — CARVEDILOL 3.12 MG: 3.12 TABLET, FILM COATED ORAL at 21:30

## 2024-03-05 RX ADMIN — DOXYCYCLINE HYCLATE 100 MG: 100 CAPSULE ORAL at 08:55

## 2024-03-05 RX ADMIN — DIVALPROEX SODIUM 250 MG: 125 CAPSULE, COATED PELLETS ORAL at 13:28

## 2024-03-05 RX ADMIN — QUETIAPINE 50 MG: 25 TABLET, FILM COATED ORAL at 08:55

## 2024-03-05 RX ADMIN — LORAZEPAM 0.5 MG: 0.5 TABLET ORAL at 08:54

## 2024-03-05 NOTE — CM
"CM following re: discharge planning."~"Reviewed pt's chart. Per RN noted, pt has not been having aggressive behaviors since yesterday, off 1:1 since yesterday. "~"CM spoke to [facility] SNF liaison, updated on pt's progress  and she will meet with the pt tomorrow to decide whether or not they will be able to offer a bed.  "~"CM spoke to [facility] liaison Carrie and she stated that [facility] has a memory care unit and they are looking for to accept the pt. per Carrie, [facility] staff will talk to pt's RN to have more information regarding pt's "~"behavior. "~"D/C plan: SNF that can accept the pt. Most likely [facility]. "~"CM will follow with discharge plan updates as hospitalization progresses"

## 2024-03-06 VITALS — RESPIRATION RATE: 18 BRPM | SYSTOLIC BLOOD PRESSURE: 140 MMHG | DIASTOLIC BLOOD PRESSURE: 78 MMHG

## 2024-03-06 VITALS — RESPIRATION RATE: 14 BRPM

## 2024-03-06 VITALS — RESPIRATION RATE: 18 BRPM | SYSTOLIC BLOOD PRESSURE: 132 MMHG | DIASTOLIC BLOOD PRESSURE: 84 MMHG

## 2024-03-06 RX ADMIN — ANTI-FUNGAL POWDER MICONAZOLE NITRATE TALC FREE 1 APPLIC: 1.42 POWDER TOPICAL at 20:21

## 2024-03-06 RX ADMIN — HYDROPHOR 1 APPLIC: 42 OINTMENT TOPICAL at 10:13

## 2024-03-06 RX ADMIN — CETIRIZINE HYDROCHLORIDE 10 MG: 10 TABLET ORAL at 10:06

## 2024-03-06 RX ADMIN — DIVALPROEX SODIUM 250 MG: 125 CAPSULE, COATED PELLETS ORAL at 10:06

## 2024-03-06 RX ADMIN — DIVALPROEX SODIUM 250 MG: 125 CAPSULE, COATED PELLETS ORAL at 21:07

## 2024-03-06 RX ADMIN — QUETIAPINE 50 MG: 25 TABLET, FILM COATED ORAL at 17:33

## 2024-03-06 RX ADMIN — CARVEDILOL 3.12 MG: 3.12 TABLET, FILM COATED ORAL at 10:06

## 2024-03-06 RX ADMIN — CARVEDILOL 3.12 MG: 3.12 TABLET, FILM COATED ORAL at 20:19

## 2024-03-06 RX ADMIN — Medication 5 MG: at 21:06

## 2024-03-06 RX ADMIN — TAMSULOSIN HYDROCHLORIDE 0.4 MG: 0.4 CAPSULE ORAL at 21:06

## 2024-03-06 RX ADMIN — QUETIAPINE: 25 TABLET, FILM COATED ORAL at 13:00

## 2024-03-06 RX ADMIN — QUETIAPINE 50 MG: 25 TABLET, FILM COATED ORAL at 10:05

## 2024-03-06 RX ADMIN — DIVALPROEX SODIUM 250 MG: 125 CAPSULE, COATED PELLETS ORAL at 17:33

## 2024-03-06 RX ADMIN — LORAZEPAM 0.5 MG: 0.5 TABLET ORAL at 15:07

## 2024-03-06 RX ADMIN — DIVALPROEX SODIUM: 125 CAPSULE, COATED PELLETS ORAL at 13:00

## 2024-03-06 RX ADMIN — LORAZEPAM 0.5 MG: 0.5 TABLET ORAL at 21:07

## 2024-03-06 RX ADMIN — ASPIRIN 81 MG: 81 TABLET, COATED ORAL at 10:06

## 2024-03-06 RX ADMIN — ANTI-FUNGAL POWDER MICONAZOLE NITRATE TALC FREE 1 APPLIC: 1.42 POWDER TOPICAL at 10:14

## 2024-03-06 RX ADMIN — LORAZEPAM 0.5 MG: 0.5 TABLET ORAL at 10:07

## 2024-03-06 NOTE — W.PN.UPDATE
"Update Note"~"Progress Note Update"~"-: "~"patient seen chart reviewed. mr darcie was resting w quietly when seen.  noted that seroquel was cut back to tid over the weekend bc of sedation. spoke with dr weems using tiger text about that change. unfortunately a middle ground between sedation "~"and awake/appropriate/calm  has not been able to be found during his stay. on a + note he is not on one to one at this point and also has not required a prn in several days.  depakote level is in the 80's he has not required a prn of ativan since "~"2.28.  psych will continue to see him periodically. "

## 2024-03-06 NOTE — CM
"Addendum entered by Nadine Russ  03/06/24 16:29: "~"CM spoke with Carrie from [facility], nurse will be coming out tomorrow to evaluate patient. CM spoke with patients daughter, discussed patient to be evaluated tomorrow. Daughter requesting referrals be sent out to previous facilities as patient is "~"no longer on 1:1, additional referrals sent in [facility]. CM placed call to [facility] , Anna [phone], unable to leave message.  "~"Original Note:"~"CM spoke to [facility] liaison Carrie and she stated that she is having her nurse review clinicals now to see if they are able to accept patient, will call CM back. CM spoke with Rosalva at [facility], unable to accept patient due to "~"patients behaviors, as they do not have a locked unit or the staff to care for patients behaviors. CM will continue to follow for discharge planning needs."~"D/C plan: SNF that can accept the patient. Most likely [facility]. Patient off 1:1"

## 2024-03-06 NOTE — WOUNDNOTE
"WOC RN note: Patient's sacral/buttocks stage 2 pressure injuries appear newly healed. Body rash improved from last week. Moisture cream/ointment and Calamine lotion being used. Silicone border foam applied to sacrum. Patient turned with help from "~"nurse Ashlee. Skin on heels intact. Patient is on a [employer] air bed and has an air chair cushion. Appetite remains good. Will follow as needed. "

## 2024-03-07 VITALS — SYSTOLIC BLOOD PRESSURE: 133 MMHG | DIASTOLIC BLOOD PRESSURE: 101 MMHG | RESPIRATION RATE: 24 BRPM

## 2024-03-07 VITALS — RESPIRATION RATE: 18 BRPM | DIASTOLIC BLOOD PRESSURE: 88 MMHG | SYSTOLIC BLOOD PRESSURE: 175 MMHG

## 2024-03-07 RX ADMIN — ASPIRIN 81 MG: 81 TABLET, COATED ORAL at 08:47

## 2024-03-07 RX ADMIN — TAMSULOSIN HYDROCHLORIDE 0.4 MG: 0.4 CAPSULE ORAL at 21:14

## 2024-03-07 RX ADMIN — DIVALPROEX SODIUM 250 MG: 125 CAPSULE, COATED PELLETS ORAL at 13:16

## 2024-03-07 RX ADMIN — ANTI-FUNGAL POWDER MICONAZOLE NITRATE TALC FREE 1 APPLIC: 1.42 POWDER TOPICAL at 08:57

## 2024-03-07 RX ADMIN — LORAZEPAM 0.5 MG: 0.5 TABLET ORAL at 17:02

## 2024-03-07 RX ADMIN — CETIRIZINE HYDROCHLORIDE 10 MG: 10 TABLET ORAL at 08:47

## 2024-03-07 RX ADMIN — DIVALPROEX SODIUM 250 MG: 125 CAPSULE, COATED PELLETS ORAL at 17:02

## 2024-03-07 RX ADMIN — DIVALPROEX SODIUM 250 MG: 125 CAPSULE, COATED PELLETS ORAL at 21:14

## 2024-03-07 RX ADMIN — LORAZEPAM: 0.5 TABLET ORAL at 23:01

## 2024-03-07 RX ADMIN — QUETIAPINE 50 MG: 25 TABLET, FILM COATED ORAL at 17:02

## 2024-03-07 RX ADMIN — CARVEDILOL 3.12 MG: 3.12 TABLET, FILM COATED ORAL at 20:09

## 2024-03-07 RX ADMIN — QUETIAPINE 50 MG: 25 TABLET, FILM COATED ORAL at 08:47

## 2024-03-07 RX ADMIN — CARVEDILOL 3.12 MG: 3.12 TABLET, FILM COATED ORAL at 08:47

## 2024-03-07 RX ADMIN — QUETIAPINE 50 MG: 25 TABLET, FILM COATED ORAL at 13:15

## 2024-03-07 RX ADMIN — DIVALPROEX SODIUM 250 MG: 125 CAPSULE, COATED PELLETS ORAL at 08:47

## 2024-03-07 RX ADMIN — LORAZEPAM 0.5 MG: 0.5 TABLET ORAL at 08:49

## 2024-03-07 RX ADMIN — ANTI-FUNGAL POWDER MICONAZOLE NITRATE TALC FREE 1 APPLIC: 1.42 POWDER TOPICAL at 20:19

## 2024-03-07 RX ADMIN — Medication 5 MG: at 21:14

## 2024-03-07 RX ADMIN — HYDROPHOR 1 APPLIC: 42 OINTMENT TOPICAL at 08:57

## 2024-03-07 NOTE — W.PN.UPDATE
"Update Note"~"Progress Note Update"~"-: "~"patient quiet this am. he is off one to one though still has the med sitter.  his room had to be changed to accomodate covid patients but he is now very close to nurse's station. he has not received a prn since 2/28.  will continue to follow no "~"changes made in meds today. "

## 2024-03-07 NOTE — CM
"Chart reviewed and  met with patient and patient is on med sitter, referrals sent and  will reach out to [facility]   Anna [phone] to review any options she may have for placement."~"Plan; Patient needs to be off med sitter for skilled placement. "

## 2024-03-08 VITALS — DIASTOLIC BLOOD PRESSURE: 104 MMHG | RESPIRATION RATE: 24 BRPM | SYSTOLIC BLOOD PRESSURE: 187 MMHG

## 2024-03-08 VITALS — DIASTOLIC BLOOD PRESSURE: 93 MMHG | SYSTOLIC BLOOD PRESSURE: 166 MMHG | RESPIRATION RATE: 20 BRPM

## 2024-03-08 VITALS — DIASTOLIC BLOOD PRESSURE: 126 MMHG | SYSTOLIC BLOOD PRESSURE: 187 MMHG | RESPIRATION RATE: 24 BRPM

## 2024-03-08 RX ADMIN — LORAZEPAM: 0.5 TABLET ORAL at 22:33

## 2024-03-08 RX ADMIN — QUETIAPINE 25 MG: 25 TABLET, FILM COATED ORAL at 15:10

## 2024-03-08 RX ADMIN — LORAZEPAM 0.5 MG: 0.5 TABLET ORAL at 06:38

## 2024-03-08 RX ADMIN — ANTI-FUNGAL POWDER MICONAZOLE NITRATE TALC FREE 1 APPLIC: 1.42 POWDER TOPICAL at 20:52

## 2024-03-08 RX ADMIN — TAMSULOSIN HYDROCHLORIDE 0.4 MG: 0.4 CAPSULE ORAL at 20:55

## 2024-03-08 RX ADMIN — ASPIRIN 81 MG: 81 TABLET, COATED ORAL at 07:39

## 2024-03-08 RX ADMIN — CARVEDILOL 6.25 MG: 6.25 TABLET, FILM COATED ORAL at 20:52

## 2024-03-08 RX ADMIN — QUETIAPINE 50 MG: 25 TABLET, FILM COATED ORAL at 07:38

## 2024-03-08 RX ADMIN — HYDROPHOR 1 APPLIC: 42 OINTMENT TOPICAL at 07:40

## 2024-03-08 RX ADMIN — CARVEDILOL 3.12 MG: 3.12 TABLET, FILM COATED ORAL at 07:35

## 2024-03-08 RX ADMIN — QUETIAPINE: 25 TABLET, FILM COATED ORAL at 22:33

## 2024-03-08 RX ADMIN — DIVALPROEX SODIUM 250 MG: 125 CAPSULE, COATED PELLETS ORAL at 15:52

## 2024-03-08 RX ADMIN — DIVALPROEX SODIUM 250 MG: 125 CAPSULE, COATED PELLETS ORAL at 20:54

## 2024-03-08 RX ADMIN — Medication: at 22:33

## 2024-03-08 RX ADMIN — ANTI-FUNGAL POWDER MICONAZOLE NITRATE TALC FREE 1 APPLIC: 1.42 POWDER TOPICAL at 07:40

## 2024-03-08 RX ADMIN — LORAZEPAM 0.5 MG: 0.5 TABLET ORAL at 15:52

## 2024-03-08 RX ADMIN — CETIRIZINE HYDROCHLORIDE 10 MG: 10 TABLET ORAL at 07:38

## 2024-03-08 RX ADMIN — DIVALPROEX SODIUM 250 MG: 125 CAPSULE, COATED PELLETS ORAL at 07:38

## 2024-03-08 NOTE — CM
"CM spoke with [facility], Anna Castillo will email  facilities to send referrals to. Patients daughter requesting call from psych, TT sent to Dr. Dunne with request. CM will continue to follow for discharge planning and placement efforts."~"Plan; patient must be off medsitter for SNF placement. "

## 2024-03-08 NOTE — W.PN.UPDATE
"Addendum entered and electronically signed by Isacc Dunne MD  03/08/24 14:54: "~"spoke to daughter aldo about ethics consult meeting to be held next week. she would like to be in attendance with her mother and patient's brother. will speak with ms borges whom i have already alerted re ethics consult. "~"Original Note:"~"Update Note"~"Progress Note Update"~"-: "~"patient seen chart reviewed. spoke with nursing and with dr padilla.  nursing expressed great concern that patient is oversedated. i have writtten about this in prior notes.  i have spoken with hospice.  this is a dilemma as the patient vacillates "~"between oversedation and agitation which is potentially a risk to himself and those caring for him.  i will cut back on meds today reduce seroquel by 50 mg and depakote by 250 mg but the risk of his becoming agitated again increases and this cannot "~"be comfortable for the patient and is a risk for staff. i have spoken at length w hospice. there is no obvious solution with engagement of hospice either.  i am going to call ms borges about an ethics consult as how to proceed is not immediatly "~"obvious here in terms of the long term and possibly family could come to the meeting.  "

## 2024-03-09 VITALS — DIASTOLIC BLOOD PRESSURE: 83 MMHG | SYSTOLIC BLOOD PRESSURE: 167 MMHG | RESPIRATION RATE: 18 BRPM

## 2024-03-09 VITALS — DIASTOLIC BLOOD PRESSURE: 87 MMHG | SYSTOLIC BLOOD PRESSURE: 153 MMHG | RESPIRATION RATE: 16 BRPM

## 2024-03-09 VITALS — DIASTOLIC BLOOD PRESSURE: 97 MMHG | RESPIRATION RATE: 18 BRPM | SYSTOLIC BLOOD PRESSURE: 156 MMHG

## 2024-03-09 RX ADMIN — QUETIAPINE: 25 TABLET, FILM COATED ORAL at 22:14

## 2024-03-09 RX ADMIN — LORAZEPAM 0.5 MG: 0.5 TABLET ORAL at 09:45

## 2024-03-09 RX ADMIN — ANTI-FUNGAL POWDER MICONAZOLE NITRATE TALC FREE 1 APPLIC: 1.42 POWDER TOPICAL at 19:50

## 2024-03-09 RX ADMIN — QUETIAPINE 25 MG: 25 TABLET, FILM COATED ORAL at 23:31

## 2024-03-09 RX ADMIN — DIVALPROEX SODIUM 250 MG: 125 CAPSULE, COATED PELLETS ORAL at 09:45

## 2024-03-09 RX ADMIN — CARVEDILOL 6.25 MG: 6.25 TABLET, FILM COATED ORAL at 09:45

## 2024-03-09 RX ADMIN — DIVALPROEX SODIUM 250 MG: 125 CAPSULE, COATED PELLETS ORAL at 23:31

## 2024-03-09 RX ADMIN — LORAZEPAM 0.5 MG: 0.5 TABLET ORAL at 17:38

## 2024-03-09 RX ADMIN — HYDROPHOR 1 APPLIC: 42 OINTMENT TOPICAL at 09:45

## 2024-03-09 RX ADMIN — QUETIAPINE 50 MG: 25 TABLET, FILM COATED ORAL at 09:45

## 2024-03-09 RX ADMIN — Medication 5 MG: at 23:30

## 2024-03-09 RX ADMIN — LORAZEPAM 0.5 MG: 0.5 TABLET ORAL at 23:33

## 2024-03-09 RX ADMIN — CETIRIZINE HYDROCHLORIDE 10 MG: 10 TABLET ORAL at 09:45

## 2024-03-09 RX ADMIN — ASPIRIN 81 MG: 81 TABLET, COATED ORAL at 09:45

## 2024-03-09 RX ADMIN — DIVALPROEX SODIUM: 125 CAPSULE, COATED PELLETS ORAL at 22:14

## 2024-03-09 RX ADMIN — TAMSULOSIN HYDROCHLORIDE 0.4 MG: 0.4 CAPSULE ORAL at 19:51

## 2024-03-09 RX ADMIN — DIVALPROEX SODIUM 250 MG: 125 CAPSULE, COATED PELLETS ORAL at 17:37

## 2024-03-09 RX ADMIN — ANTI-FUNGAL POWDER MICONAZOLE NITRATE TALC FREE 1 APPLIC: 1.42 POWDER TOPICAL at 09:45

## 2024-03-09 RX ADMIN — Medication: at 22:14

## 2024-03-09 RX ADMIN — LORAZEPAM: 0.5 TABLET ORAL at 22:14

## 2024-03-09 RX ADMIN — CARVEDILOL 6.25 MG: 6.25 TABLET, FILM COATED ORAL at 19:49

## 2024-03-09 RX ADMIN — QUETIAPINE: 25 TABLET, FILM COATED ORAL at 16:14

## 2024-03-09 NOTE — W.PN.UPDATE
"Update Note"~"Progress Note Update"~"-: "~"Psychiatry consult. Chart reviewed. Seen by Dr. Dunne yesterday at which time there was concern for oversedation and seroquel was decreased to 50mg daily and 25mg BID and Depakote was decreased to 250mg TID. Dr. Dunne also recommended an ethics "~"consult secondary to management concerns given vacillation between oversedation and agitation (which is a potential risk to himself and those caring for him). Family will be involved. At this time patient is somnolent. I am able to arouse him but he "~"falls back asleep."~"Plan- will see how he does with this recent reduction in medication and monitor agitation/confusion. Ethics meeting next week as noted above. Psych will follow."

## 2024-03-10 VITALS — SYSTOLIC BLOOD PRESSURE: 165 MMHG | DIASTOLIC BLOOD PRESSURE: 93 MMHG | RESPIRATION RATE: 20 BRPM

## 2024-03-10 VITALS — DIASTOLIC BLOOD PRESSURE: 91 MMHG | SYSTOLIC BLOOD PRESSURE: 148 MMHG

## 2024-03-10 VITALS — SYSTOLIC BLOOD PRESSURE: 174 MMHG | DIASTOLIC BLOOD PRESSURE: 113 MMHG

## 2024-03-10 VITALS — RESPIRATION RATE: 18 BRPM | DIASTOLIC BLOOD PRESSURE: 93 MMHG | SYSTOLIC BLOOD PRESSURE: 183 MMHG

## 2024-03-10 VITALS — DIASTOLIC BLOOD PRESSURE: 63 MMHG | RESPIRATION RATE: 16 BRPM | SYSTOLIC BLOOD PRESSURE: 110 MMHG

## 2024-03-10 VITALS — SYSTOLIC BLOOD PRESSURE: 138 MMHG | RESPIRATION RATE: 16 BRPM | DIASTOLIC BLOOD PRESSURE: 88 MMHG

## 2024-03-10 LAB
ALBUMIN SERPL-MCNC: 4.3 G/DL (ref 3.5–5)
ALP SERPL-CCNC: 50 U/L (ref 38–126)
ALT SERPL-CCNC: 20 U/L (ref 0–50)
AMMONIA PLAS-SCNC: < 9 UMOL/L (ref 9–30)
AST SERPL-CCNC: 32 U/L (ref 17–59)
BUN SERPL-MCNC: 28 MG/DL (ref 9–20)
CALCIUM SERPL-MCNC: 9.9 MG/DL (ref 8.4–10.2)
CHLORIDE SERPL-SCNC: 104 MMOL/L (ref 98–107)
CO2 SERPL-SCNC: 30 MMOL/L (ref 22–30)
EGFR: > 60
ERYTHROCYTE [DISTWIDTH] IN BLOOD BY AUTOMATED COUNT: 13 % (ref 11.5–14.5)
ESTIMATED CREATININE CLEARANCE: 68 ML/MIN
GLUCOSE SERPL-MCNC: 98 MG/DL (ref 70–99)
HCT VFR BLD AUTO: 49.2 % (ref 39–52)
HGB BLD-MCNC: 17.5 G/DL (ref 13–18)
MAGNESIUM SERPL-MCNC: 2.2 MG/DL (ref 1.6–2.3)
MCHC RBC AUTO-ENTMCNC: 35.6 G/DL (ref 33–37)
MCV RBC AUTO: 89.9 FL (ref 80–94)
PLATELET # BLD AUTO: 69 10^3/UL (ref 130–400)
POTASSIUM SERPL-SCNC: 4.4 MMOL/L (ref 3.5–5.1)
PROT SERPL-MCNC: 7.4 G/DL (ref 6.3–8.2)
SODIUM SERPL-SCNC: 142 MMOL/L (ref 135–145)

## 2024-03-10 RX ADMIN — ASPIRIN: 81 TABLET, COATED ORAL at 15:19

## 2024-03-10 RX ADMIN — Medication 5 MG: at 21:13

## 2024-03-10 RX ADMIN — ANTI-FUNGAL POWDER MICONAZOLE NITRATE TALC FREE 1 APPLIC: 1.42 POWDER TOPICAL at 21:23

## 2024-03-10 RX ADMIN — QUETIAPINE 25 MG: 25 TABLET, FILM COATED ORAL at 21:13

## 2024-03-10 RX ADMIN — LORAZEPAM 0.25 MG: 0.5 TABLET ORAL at 21:22

## 2024-03-10 RX ADMIN — DIVALPROEX SODIUM: 125 CAPSULE, COATED PELLETS ORAL at 20:21

## 2024-03-10 RX ADMIN — HYDROPHOR 1 APPLIC: 42 OINTMENT TOPICAL at 09:58

## 2024-03-10 RX ADMIN — CETIRIZINE HYDROCHLORIDE: 10 TABLET ORAL at 15:19

## 2024-03-10 RX ADMIN — DIVALPROEX SODIUM 250 MG: 125 CAPSULE, COATED PELLETS ORAL at 09:57

## 2024-03-10 RX ADMIN — CARVEDILOL 12.5 MG: 12.5 TABLET, FILM COATED ORAL at 21:13

## 2024-03-10 RX ADMIN — CETIRIZINE HYDROCHLORIDE: 10 TABLET ORAL at 09:58

## 2024-03-10 RX ADMIN — CARVEDILOL 6.25 MG: 6.25 TABLET, FILM COATED ORAL at 09:58

## 2024-03-10 RX ADMIN — QUETIAPINE 50 MG: 25 TABLET, FILM COATED ORAL at 09:58

## 2024-03-10 RX ADMIN — ASPIRIN: 81 TABLET, COATED ORAL at 09:58

## 2024-03-10 RX ADMIN — TAMSULOSIN HYDROCHLORIDE 0.4 MG: 0.4 CAPSULE ORAL at 21:13

## 2024-03-10 RX ADMIN — ANTI-FUNGAL POWDER MICONAZOLE NITRATE TALC FREE 1 APPLIC: 1.42 POWDER TOPICAL at 09:59

## 2024-03-10 RX ADMIN — QUETIAPINE 25 MG: 25 TABLET, FILM COATED ORAL at 15:27

## 2024-03-10 RX ADMIN — LORAZEPAM: 0.5 TABLET ORAL at 12:55

## 2024-03-10 RX ADMIN — LORAZEPAM 0.25 MG: 0.5 TABLET ORAL at 15:28

## 2024-03-10 NOTE — PTCARENOTE
Patient drowsy but arousable through out day. Feed assist with meals. Dr. Cash notified of findings. Medications changed, see MAR. Medsitter service suspended as of 1400. Will monitor closely. Bed alarm in place for patient safety.

## 2024-03-11 VITALS — SYSTOLIC BLOOD PRESSURE: 142 MMHG | RESPIRATION RATE: 18 BRPM | DIASTOLIC BLOOD PRESSURE: 91 MMHG

## 2024-03-11 VITALS — SYSTOLIC BLOOD PRESSURE: 107 MMHG | DIASTOLIC BLOOD PRESSURE: 49 MMHG | RESPIRATION RATE: 18 BRPM

## 2024-03-11 VITALS — SYSTOLIC BLOOD PRESSURE: 185 MMHG | DIASTOLIC BLOOD PRESSURE: 115 MMHG

## 2024-03-11 LAB
ERYTHROCYTE [DISTWIDTH] IN BLOOD BY AUTOMATED COUNT: 13 % (ref 11.5–14.5)
HCT VFR BLD AUTO: 54.2 % (ref 39–52)
HGB BLD-MCNC: 18.2 G/DL (ref 13–18)
MCHC RBC AUTO-ENTMCNC: 33.6 G/DL (ref 33–37)
MCV RBC AUTO: 93.8 FL (ref 80–94)
NRBC BLD AUTO-RTO: 0 %
PLATELET # BLD AUTO: 81 10^3/UL (ref 130–400)
VALPROATE SERPL-MCNC: 47.1 UG/ML (ref 50–120)

## 2024-03-11 RX ADMIN — HYDROPHOR 1 APPLIC: 42 OINTMENT TOPICAL at 10:24

## 2024-03-11 RX ADMIN — CETIRIZINE HYDROCHLORIDE 10 MG: 10 TABLET ORAL at 10:23

## 2024-03-11 RX ADMIN — CARVEDILOL 12.5 MG: 12.5 TABLET, FILM COATED ORAL at 10:25

## 2024-03-11 RX ADMIN — TAMSULOSIN HYDROCHLORIDE 0.4 MG: 0.4 CAPSULE ORAL at 20:28

## 2024-03-11 RX ADMIN — CARVEDILOL 12.5 MG: 12.5 TABLET, FILM COATED ORAL at 20:28

## 2024-03-11 RX ADMIN — ANTI-FUNGAL POWDER MICONAZOLE NITRATE TALC FREE 1 APPLIC: 1.42 POWDER TOPICAL at 10:25

## 2024-03-11 RX ADMIN — LORAZEPAM 0.25 MG: 0.5 TABLET ORAL at 20:28

## 2024-03-11 RX ADMIN — QUETIAPINE 25 MG: 25 TABLET, FILM COATED ORAL at 20:28

## 2024-03-11 RX ADMIN — Medication 5 MG: at 20:28

## 2024-03-11 RX ADMIN — LORAZEPAM: 0.5 TABLET ORAL at 18:00

## 2024-03-11 RX ADMIN — LORAZEPAM 0.25 MG: 0.5 TABLET ORAL at 10:22

## 2024-03-11 RX ADMIN — ASPIRIN 81 MG: 81 TABLET, COATED ORAL at 10:21

## 2024-03-11 RX ADMIN — QUETIAPINE: 25 TABLET, FILM COATED ORAL at 15:00

## 2024-03-11 RX ADMIN — QUETIAPINE 50 MG: 25 TABLET, FILM COATED ORAL at 10:21

## 2024-03-11 NOTE — PTCARENOTE
"patient ordered abdominal ultrasound earlier today and pt was to be NPO for 6 hours prior to procedure, so the earliest pt could receive US would be 1700. due to pt unpredictable aggressive behavior, RN called US and spoke with Noman to ask if pt "~"could receive procedure at bedside. Noman from US said that they have limited staff after 1600 and couldn't do bedside today. RN asked if pt could receive US in morning instead and if it could be done at bedside, Noman stated that it shouldn't be a "~"problem for the morning. MD aware and okay for US to be done on 3/12. Report given to nightshift RN about bedside procedure in the morning and to follow up in the morning."

## 2024-03-12 VITALS — DIASTOLIC BLOOD PRESSURE: 74 MMHG | SYSTOLIC BLOOD PRESSURE: 138 MMHG

## 2024-03-12 VITALS — DIASTOLIC BLOOD PRESSURE: 104 MMHG | SYSTOLIC BLOOD PRESSURE: 178 MMHG | RESPIRATION RATE: 16 BRPM

## 2024-03-12 VITALS — DIASTOLIC BLOOD PRESSURE: 94 MMHG | SYSTOLIC BLOOD PRESSURE: 181 MMHG | RESPIRATION RATE: 18 BRPM

## 2024-03-12 VITALS — RESPIRATION RATE: 14 BRPM

## 2024-03-12 LAB
BUN SERPL-MCNC: 33 MG/DL (ref 9–20)
CALCIUM SERPL-MCNC: 9.9 MG/DL (ref 8.4–10.2)
CHLORIDE SERPL-SCNC: 109 MMOL/L (ref 98–107)
CO2 SERPL-SCNC: 26 MMOL/L (ref 22–30)
EGFR: > 60
ERYTHROCYTE [DISTWIDTH] IN BLOOD BY AUTOMATED COUNT: 13.1 % (ref 11.5–14.5)
ESTIMATED CREATININE CLEARANCE: 77 ML/MIN
GLUCOSE SERPL-MCNC: 104 MG/DL (ref 70–99)
HCT VFR BLD AUTO: 49.9 % (ref 39–52)
HGB BLD-MCNC: 17 G/DL (ref 13–18)
MCHC RBC AUTO-ENTMCNC: 34.1 G/DL (ref 33–37)
MCV RBC AUTO: 92.2 FL (ref 80–94)
NRBC BLD AUTO-RTO: 0 %
PLATELET # BLD AUTO: 73 10^3/UL (ref 130–400)
POTASSIUM SERPL-SCNC: 3.7 MMOL/L (ref 3.5–5.1)
SODIUM SERPL-SCNC: 145 MMOL/L (ref 135–145)

## 2024-03-12 RX ADMIN — ASPIRIN: 81 TABLET, COATED ORAL at 08:02

## 2024-03-12 RX ADMIN — ANTI-FUNGAL POWDER MICONAZOLE NITRATE TALC FREE 1 APPLIC: 1.42 POWDER TOPICAL at 08:02

## 2024-03-12 RX ADMIN — ASPIRIN: 81 TABLET, COATED ORAL at 11:46

## 2024-03-12 RX ADMIN — Medication 5 MG: at 21:52

## 2024-03-12 RX ADMIN — HYDROPHOR 1 APPLIC: 42 OINTMENT TOPICAL at 08:02

## 2024-03-12 RX ADMIN — TAMSULOSIN HYDROCHLORIDE 0.4 MG: 0.4 CAPSULE ORAL at 21:53

## 2024-03-12 RX ADMIN — CARVEDILOL 12.5 MG: 12.5 TABLET, FILM COATED ORAL at 08:01

## 2024-03-12 RX ADMIN — QUETIAPINE 25 MG: 25 TABLET, FILM COATED ORAL at 21:53

## 2024-03-12 RX ADMIN — LORAZEPAM 0.25 MG: 0.5 TABLET ORAL at 15:08

## 2024-03-12 RX ADMIN — CARVEDILOL 12.5 MG: 12.5 TABLET, FILM COATED ORAL at 20:39

## 2024-03-12 RX ADMIN — LORAZEPAM 0.25 MG: 0.5 TABLET ORAL at 21:53

## 2024-03-12 RX ADMIN — QUETIAPINE 25 MG: 25 TABLET, FILM COATED ORAL at 14:39

## 2024-03-12 RX ADMIN — LORAZEPAM 0.25 MG: 0.5 TABLET ORAL at 07:59

## 2024-03-12 RX ADMIN — ANTI-FUNGAL POWDER MICONAZOLE NITRATE TALC FREE 1 APPLIC: 1.42 POWDER TOPICAL at 00:19

## 2024-03-12 RX ADMIN — QUETIAPINE 50 MG: 25 TABLET, FILM COATED ORAL at 08:01

## 2024-03-12 RX ADMIN — CETIRIZINE HYDROCHLORIDE: 10 TABLET ORAL at 11:46

## 2024-03-12 RX ADMIN — ANTI-FUNGAL POWDER MICONAZOLE NITRATE TALC FREE 1 APPLIC: 1.42 POWDER TOPICAL at 20:40

## 2024-03-12 NOTE — PTCARENOTE
"Patient awake and restless at beginning of shift. Constantly trying to get up from bed and chair. Patient toileted and offered food and drink. No nonverbal signs of pain or discomfort. Patient needing frequent redirection. Tolerated medications. "~"Patient resting at current time. "

## 2024-03-13 VITALS — DIASTOLIC BLOOD PRESSURE: 125 MMHG | SYSTOLIC BLOOD PRESSURE: 212 MMHG | RESPIRATION RATE: 24 BRPM

## 2024-03-13 VITALS — SYSTOLIC BLOOD PRESSURE: 169 MMHG | DIASTOLIC BLOOD PRESSURE: 96 MMHG

## 2024-03-13 VITALS — RESPIRATION RATE: 20 BRPM

## 2024-03-13 VITALS — SYSTOLIC BLOOD PRESSURE: 152 MMHG | DIASTOLIC BLOOD PRESSURE: 89 MMHG

## 2024-03-13 LAB
BUN SERPL-MCNC: 33 MG/DL (ref 9–20)
CALCIUM SERPL-MCNC: 9.7 MG/DL (ref 8.4–10.2)
CHLORIDE SERPL-SCNC: 107 MMOL/L (ref 98–107)
CO2 SERPL-SCNC: 30 MMOL/L (ref 22–30)
EGFR: > 60
ERYTHROCYTE [DISTWIDTH] IN BLOOD BY AUTOMATED COUNT: 13.2 % (ref 11.5–14.5)
ESTIMATED CREATININE CLEARANCE: 77 ML/MIN
GLUCOSE SERPL-MCNC: 105 MG/DL (ref 70–99)
HCT VFR BLD AUTO: 51 % (ref 39–52)
HGB BLD-MCNC: 17.3 G/DL (ref 13–18)
MCHC RBC AUTO-ENTMCNC: 33.9 G/DL (ref 33–37)
MCV RBC AUTO: 93.2 FL (ref 80–94)
PLATELET # BLD AUTO: 66 10^3/UL (ref 130–400)
POTASSIUM SERPL-SCNC: 3.9 MMOL/L (ref 3.5–5.1)
SODIUM SERPL-SCNC: 145 MMOL/L (ref 135–145)

## 2024-03-13 RX ADMIN — QUETIAPINE: 25 TABLET, FILM COATED ORAL at 13:56

## 2024-03-13 RX ADMIN — LORAZEPAM 0.25 MG: 0.5 TABLET ORAL at 17:21

## 2024-03-13 RX ADMIN — QUETIAPINE 50 MG: 25 TABLET, FILM COATED ORAL at 10:53

## 2024-03-13 RX ADMIN — Medication: at 21:18

## 2024-03-13 RX ADMIN — CARVEDILOL 12.5 MG: 12.5 TABLET, FILM COATED ORAL at 10:53

## 2024-03-13 RX ADMIN — ANTI-FUNGAL POWDER MICONAZOLE NITRATE TALC FREE 1 APPLIC: 1.42 POWDER TOPICAL at 19:49

## 2024-03-13 RX ADMIN — QUETIAPINE: 25 TABLET, FILM COATED ORAL at 21:17

## 2024-03-13 RX ADMIN — CEPHALEXIN 500 MG: 500 CAPSULE ORAL at 21:17

## 2024-03-13 RX ADMIN — QUETIAPINE: 25 TABLET, FILM COATED ORAL at 14:00

## 2024-03-13 RX ADMIN — LORAZEPAM 0.25 MG: 0.5 TABLET ORAL at 10:51

## 2024-03-13 RX ADMIN — APIXABAN 10 MG: 5 TABLET, FILM COATED ORAL at 17:22

## 2024-03-13 RX ADMIN — CARVEDILOL 12.5 MG: 12.5 TABLET, FILM COATED ORAL at 19:48

## 2024-03-13 RX ADMIN — AMLODIPINE BESYLATE 5 MG: 5 TABLET ORAL at 10:58

## 2024-03-13 RX ADMIN — CETIRIZINE HYDROCHLORIDE 10 MG: 10 TABLET ORAL at 10:54

## 2024-03-13 RX ADMIN — ASPIRIN 81 MG: 81 TABLET, COATED ORAL at 10:51

## 2024-03-13 RX ADMIN — QUETIAPINE 25 MG: 25 TABLET, FILM COATED ORAL at 23:15

## 2024-03-13 RX ADMIN — ANTI-FUNGAL POWDER MICONAZOLE NITRATE TALC FREE 1 APPLIC: 1.42 POWDER TOPICAL at 10:55

## 2024-03-13 RX ADMIN — HYDROPHOR 1 APPLIC: 42 OINTMENT TOPICAL at 10:54

## 2024-03-13 RX ADMIN — TAMSULOSIN HYDROCHLORIDE 0.4 MG: 0.4 CAPSULE ORAL at 21:16

## 2024-03-13 RX ADMIN — LORAZEPAM 0.25 MG: 0.5 TABLET ORAL at 17:20

## 2024-03-13 RX ADMIN — CEPHALEXIN 500 MG: 500 CAPSULE ORAL at 17:25

## 2024-03-13 RX ADMIN — LORAZEPAM: 0.5 TABLET ORAL at 21:18

## 2024-03-13 NOTE — W.PN.UPDATE
"Update Note"~"Progress Note Update"~"-: "~"patient seen chart reviewed. discussed with nursing and with case management.  mr sprague remains much the same.  he was sedated when i saw him today along with nursing. it was lunchtime and they wanted to feed him. we held the seroquel as he was "~"somewhat difficult to rouse as per the order.  speaking with staff as to the situation . patient has been her at  since mid january with no placement on [facility] so he remains in an acute care hospital without an acute problem needing care.  "~"dr soto sent me a message re development of a pressure ulcer.  ethics committee meeting tentatively scheduled for tomorrow to brainstorm re a plan for mr sprague's future. have added hold for sedation to ativan standing order"

## 2024-03-13 NOTE — HOSPNOTE
Ethics meeting tomorrow at 12noon. I will be attending supporting family make some decisions about hospice care and the philosophy and the goals of care.

## 2024-03-13 NOTE — WOUNDNOTE
"[facility] RN note: Location where previous stage 2 sacral/buttocks pressure injuries resolved. Patient has a new red raised area on his R sacral iliac area. Body rash noted mostly on arms, legs, hips. Aquaphor ointment was applied by RN Will. Applied "~"Aquaphor to legs. Patient now out of Aquaphor. t/c Pharmacy and requested more Aquaphor and also Calamine lotion. t/c Ordered hypoallergenic sheets from [facility]. Patient is on a [employer] air bed and has an air chair cushion R heel blanchable dull red. "~"Patient moves his feet/legs frequently. Updated Dr. Gutierrez re: new raised red area R sacral iliac, body rash still present mostly on arms, legs, hips, ordered hypoallergenic sheets from [facility]; defer to hospitalist if general surgery consult vs "~"reconsult ID indicated to evaluate R sacral iliac for possible abscess. Discussed with RN Will. "

## 2024-03-13 NOTE — W.PN.ID1
"Addendum entered and electronically signed by Soraida Quinonez MD  03/13/24 16:35: "~"New pressure wound felt to be developing based on CT scan"~"Wound care and offloading"~"Another indication for hospice"~"will start keflex"~"Original Note:"~"Date of Service"~"-: "~"Date of Service:  March 13, 2024"~"Today's Communication"~"-: "~"End stage dementia - continue to recommend hospice"~"evaluate for possible abscess with CT scan"~"Assessment / Plan"~"-: "~"End Stage Dementia"~"- delirious, incontinent, unable to feed self; does speak a few words, "~"- 17 falls recorded outpatient; functionally nonambulatory at this time, PTs reporting very unsteady gait and he is not improving with PT when he is able to participate"~"- note 16 kg weight loss - RDs following"~"- developing recurrent pressure wounds "~"- in my opinion would meet criteria for hospice for end stage dementia with less than a 6 month expected life span.  I will attend the ethics meeting tomorrow at noon.  I have spoken with Dr Gutierrez who has agreed to attend this meeting."~"Probable Developing Sacral Abscess"~"- CT scan with contrast if safe/feasible; if patient unable to tolerate a PIV then will order it noncontrasted"~"- further recommendations pending CT scan"~"Chief Complaint"~"-: Other (rash)"~"Subjective / Review of Systems"~"-: "~"Asked to reassess patient for possible sacral abscess with raise, erythematous area on the buttocks"~"Note healing of previous stage 2 sacral wound"~"New R heel stage I pressure ulcer"~"Big picture - gait remains very unsteady, patient at high risk for falls"~"PT reporting patient abulic"~"has lost 16 kg in the last 3 months"~"Vital Signs / Physical Exam"~"Vital Signs"~"-: "~"Vital Signs"~"Temp	Pulse	Resp	BP	Pulse Ox"~" 96.5 F L	 72 	 20 	 169/96 	 96 "~" 03/13/24 11:21	 03/13/24 10:53	 03/13/24 11:21	 03/13/24 10:53	 03/13/24 11:21"~"Physical Exam"~"Constitutional: No Acute Distress and Chronically Ill"~"Cardiovascular: Regular Rate and S1/S2; Negative Murmur or Rub"~"Pulmonary: Clear and Symmetric; Negative Wheezes or Rales"~"Gastrointestinal: Soft, Non Tender, Non Distended and Normal Bowel Sounds"~"Skin: Warm and Dry; Negative Rash or Jaundice"~"Objective Data"~"Lab Data"~"-: "~"Lab Results"~"03/12/24 08:35 "~"03/12/24 08:35 "~"PT	 14.5 Sec (11.4-14.6)  	01/18/24  09:45    "~"INR	 1.11  	01/18/24  09:45    "~"Estimated Creat Clear	 77 ml/min 	03/12/24  08:35    "~"Total Bilirubin	 1.1 mg/dl (0.2-1.3)  	03/10/24  18:03    "~"AST	 32 U/L (17-59)  	03/10/24  18:03    "~"ALT	 20 U/L (0-50)  	03/10/24  18:03    "~"Alkaline Phosphatase	 50 U/L ()  	03/10/24  18:03    "~"Most recent labs reviewed."~"Micro Results: "~" 01/17/24 18:28	Influenza Types A & B (JASKARAN) - Final"~" Nasal Swab	   Negative for Influenza A & B, NAAT"~"	   Negative results must be combined with clinical observations"~"	   and patient history."~"	   Nucleic Acid Amplification test (NAAT)performed on the"~"	   [employer] NOW platform."~"Care Review"~"Plan reviewed with: Physician (Dr Gutierrez - end stage dementia)"

## 2024-03-13 NOTE — CM
"CM left voicemail for [facility], awaiting return call regarding accepting patient. CM spoke with Anna,  from [facility], also will reach out to [facility]. Additional referrals sent again to [facility], "~"[facility] as [facility]. CM spoke with Rosalva from [facility], unable to accept patient at [facility] or [facility]. [facility] also unable to accept patient at this time. Per Anna, awaiting responses from [facility] "~"in [place], [facility] in [place], and [facility] [place]. "~"Ethics meeting planned for tomorrow at 12:00 p.m. CM will continue to follow for discharge planning needs."~"Plan; SNF pending placement. "

## 2024-03-14 VITALS — RESPIRATION RATE: 18 BRPM | DIASTOLIC BLOOD PRESSURE: 69 MMHG | SYSTOLIC BLOOD PRESSURE: 122 MMHG

## 2024-03-14 VITALS — SYSTOLIC BLOOD PRESSURE: 121 MMHG | RESPIRATION RATE: 20 BRPM | DIASTOLIC BLOOD PRESSURE: 74 MMHG

## 2024-03-14 VITALS — SYSTOLIC BLOOD PRESSURE: 156 MMHG | RESPIRATION RATE: 18 BRPM | DIASTOLIC BLOOD PRESSURE: 96 MMHG

## 2024-03-14 LAB
ALBUMIN SERPL-MCNC: 4.6 G/DL (ref 3.5–5)
ALP SERPL-CCNC: 61 U/L (ref 38–126)
ALT SERPL-CCNC: 27 U/L (ref 0–50)
AST SERPL-CCNC: 33 U/L (ref 17–59)
BUN SERPL-MCNC: 28 MG/DL (ref 9–20)
BUN SERPL-MCNC: 32 MG/DL (ref 9–20)
CALCIUM SERPL-MCNC: 10.1 MG/DL (ref 8.4–10.2)
CALCIUM SERPL-MCNC: 9.3 MG/DL (ref 8.4–10.2)
CHLORIDE SERPL-SCNC: 105 MMOL/L (ref 98–107)
CHLORIDE SERPL-SCNC: 107 MMOL/L (ref 98–107)
CO2 SERPL-SCNC: 31 MMOL/L (ref 22–30)
CO2 SERPL-SCNC: 31 MMOL/L (ref 22–30)
EGFR: > 60
EGFR: > 60
ERYTHROCYTE [DISTWIDTH] IN BLOOD BY AUTOMATED COUNT: 13.2 % (ref 11.5–14.5)
ESTIMATED CREATININE CLEARANCE: 68 ML/MIN
ESTIMATED CREATININE CLEARANCE: 77 ML/MIN
GLUCOSE SERPL-MCNC: 116 MG/DL (ref 70–99)
GLUCOSE SERPL-MCNC: 83 MG/DL (ref 70–99)
HCT VFR BLD AUTO: 55.6 % (ref 39–52)
HGB BLD-MCNC: 18.7 G/DL (ref 13–18)
MAGNESIUM SERPL-MCNC: 2.3 MG/DL (ref 1.6–2.3)
MAGNESIUM SERPL-MCNC: 2.3 MG/DL (ref 1.6–2.3)
MCHC RBC AUTO-ENTMCNC: 33.6 G/DL (ref 33–37)
MCV RBC AUTO: 93.1 FL (ref 80–94)
PLATELET # BLD AUTO: 73 10^3/UL (ref 130–400)
POTASSIUM SERPL-SCNC: 3.6 MMOL/L (ref 3.5–5.1)
POTASSIUM SERPL-SCNC: 3.8 MMOL/L (ref 3.5–5.1)
PROT SERPL-MCNC: 7.9 G/DL (ref 6.3–8.2)
SODIUM SERPL-SCNC: 144 MMOL/L (ref 135–145)
SODIUM SERPL-SCNC: 149 MMOL/L (ref 135–145)

## 2024-03-14 RX ADMIN — APIXABAN 10 MG: 5 TABLET, FILM COATED ORAL at 08:57

## 2024-03-14 RX ADMIN — QUETIAPINE 25 MG: 25 TABLET, FILM COATED ORAL at 16:34

## 2024-03-14 RX ADMIN — CEPHALEXIN 500 MG: 500 CAPSULE ORAL at 16:34

## 2024-03-14 RX ADMIN — LORAZEPAM 0.25 MG: 0.5 TABLET ORAL at 22:28

## 2024-03-14 RX ADMIN — LORAZEPAM 0.25 MG: 0.5 TABLET ORAL at 08:56

## 2024-03-14 RX ADMIN — CEPHALEXIN 500 MG: 500 CAPSULE ORAL at 08:57

## 2024-03-14 RX ADMIN — ANTI-FUNGAL POWDER MICONAZOLE NITRATE TALC FREE 1 APPLIC: 1.42 POWDER TOPICAL at 20:01

## 2024-03-14 RX ADMIN — HYDROPHOR 1 APPLIC: 42 OINTMENT TOPICAL at 08:58

## 2024-03-14 RX ADMIN — APIXABAN 10 MG: 5 TABLET, FILM COATED ORAL at 20:01

## 2024-03-14 RX ADMIN — CARVEDILOL 12.5 MG: 12.5 TABLET, FILM COATED ORAL at 20:02

## 2024-03-14 RX ADMIN — AMLODIPINE BESYLATE 5 MG: 5 TABLET ORAL at 08:55

## 2024-03-14 RX ADMIN — DEXTROSE MONOHYDRATE 1000: 5 INJECTION, SOLUTION INTRAVENOUS at 14:10

## 2024-03-14 RX ADMIN — LORAZEPAM 0.25 MG: 0.5 TABLET ORAL at 16:34

## 2024-03-14 RX ADMIN — CEPHALEXIN 500 MG: 500 CAPSULE ORAL at 14:07

## 2024-03-14 RX ADMIN — DEXTROSE MONOHYDRATE 1000: 5 INJECTION, SOLUTION INTRAVENOUS at 11:50

## 2024-03-14 RX ADMIN — ANTI-FUNGAL POWDER MICONAZOLE NITRATE TALC FREE 1 APPLIC: 1.42 POWDER TOPICAL at 08:58

## 2024-03-14 RX ADMIN — QUETIAPINE 50 MG: 25 TABLET, FILM COATED ORAL at 08:55

## 2024-03-14 RX ADMIN — QUETIAPINE 25 MG: 25 TABLET, FILM COATED ORAL at 22:28

## 2024-03-14 RX ADMIN — CETIRIZINE HYDROCHLORIDE 10 MG: 10 TABLET ORAL at 08:57

## 2024-03-14 RX ADMIN — Medication 5 MG: at 22:28

## 2024-03-14 RX ADMIN — TAMSULOSIN HYDROCHLORIDE 0.4 MG: 0.4 CAPSULE ORAL at 22:28

## 2024-03-14 RX ADMIN — CARVEDILOL 12.5 MG: 12.5 TABLET, FILM COATED ORAL at 08:56

## 2024-03-14 NOTE — PTCARENOTE
Patient sleeping in beginning of shift. Ativan and Seroquel initially held. Patient woke up and was becoming restless with multiple attempts to get out of bed on own. Scheduled Seroquel given but Ativan held.

## 2024-03-14 NOTE — CHAP
"Ethics consult placed and panel convened.  Family attended.  Meeting ended with all parties in agreement for hospice as the plan with comfort and dignity as the goal.  Family expressed gratitude.  Pastoral Care will continue to follow with the "~"family."

## 2024-03-14 NOTE — CM
"Ethics meeting held. Family agreeable to hospice, patient will be transferred to  and admitted to hospice tomorrow, 3/15. Hospice referral sent in [facility]. CM will continue to offer support to family. CM will continue to follow for discharge "~"planning needs."~"Plan; admit to hospice 3/15, transfer to  to private room. "

## 2024-03-14 NOTE — HOSPNOTE
Patient will be transferred to [facility] and admitted on hospice inpatient tomorrow 3/15. Family meeting and family in agreement with hospice and the philosophy. Case management aware of plan.

## 2024-03-14 NOTE — W.PN.UPDATE
"Update Note"~"Progress Note Update"~"-: "~"patient seen chart reviewed.  at this moment mr sprague is dozing and resting quietly. he still has not had prns since february 28. 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 this afternoon there will be a meeting of several deparments within the context of ethics committee to discuss the best way to proceed with mr sprague's care and disposition going forward. "

## 2024-03-14 NOTE — W.PN.ID1
"Date of Service"~"-: "~"Date of Service:  March 14, 2024"~"Today's Communication"~"-: "~"- in my opinion would meet criteria for hospice for end stage dementia with less than a 6 month expected life span.  Discussed with family at Ethics meeting today laying out the concerns above and my recommendation of hospice which was accepted.  "~"Will stop keflex; Dr Gutierrez will assist with inpatient hospice plans"~"I will no longer actively follow this patient but remain available to reassess or discuss further with family or staff if additional questions arise."~"Assessment / Plan"~"-: "~"End Stage Dementia"~"Deep Tissue injury"~"- delirious, incontinent, unable to feed self; does speak a few words, "~"- 17 falls recorded outpatient; functionally nonambulatory at this time, PTs reporting very unsteady gait and he is not improving with PT when he is able to participate"~"- note 16 kg weight loss - RDs following"~"- developing recurrent pressure wounds - bilateral sacral deep tissue injuries noted"~"- in my opinion would meet criteria for hospice for end stage dementia with less than a 6 month expected life span.  Discussed with family at Ethics meeting today laying out the concerns above and my recommendation of hospice which was accepted.  "~"Will stop keflex; Dr Gutierrez will assist with inpatient hospice plans"~"I will no longer actively follow this patient but remain available to reassess or discuss further with family or staff if additional questions arise."~"Chief Complaint"~"-: Other (rash)"~"Subjective / Review of Systems"~"-: "~"afebrile"~"bp stable"~"sedated"~"family meeting/ethics consult held today; had long discussion with patients family and staff. "~"Vital Signs / Physical Exam"~"Vital Signs"~"-: "~"Vital Signs"~"Temp	Pulse	Resp	BP	Pulse Ox"~" 97.8 F 	 67 	 18 	 122/69 	 96 "~" 03/14/24 15:30	 03/14/24 15:30	 03/14/24 15:30	 03/14/24 15:30	 03/14/24 15:30"~"Objective Data"~"Lab Data"~"-: "~"Lab Results"~"03/14/24 07:27 "~"PT	 14.5 Sec (11.4-14.6)  	01/18/24  09:45    "~"INR	 1.11  	01/18/24  09:45    "~"Estimated Creat Clear	 77 ml/min 	03/14/24  07:27    "~"Total Bilirubin	 1.1 mg/dl (0.2-1.3)  	03/14/24  07:27    "~"AST	 33 U/L (17-59)  	03/14/24  07:27    "~"ALT	 27 U/L (0-50)  	03/14/24  07:27    "~"Alkaline Phosphatase	 61 U/L ()  	03/14/24  07:27    "~"Most recent labs reviewed."~"Micro Results: "~" 01/17/24 18:28	Influenza Types A & B (JASKARAN) - Final"~" Nasal Swab	   Negative for Influenza A & B, NAAT"~"	   Negative results must be combined with clinical observations"~"	   and patient history."~"	   Nucleic Acid Amplification test (NAAT)performed on the"~"	   [employer] NOW platform."~"Care Review"~"Plan reviewed with: Physician (Dr Gutierrez, Dr Dunne - hospice recommendation)"

## 2024-03-14 NOTE — W.CON.NEPH
"Consultation"~"-"~"Date/Time Consultation Requested: 24 10:20AM"~"Date/Time Consultation Performed: 3/14/24 1:43PM"~"Requesting Provider: Murray Mao"~"Performing Provider: Tracey Smtih"~"Reason for Consultation: hypernatremia"~"Medical History"~"-"~"Chief Complaint: hypernatremia"~"History of Present Illness: "~"Mr. España is a 83YOM with PMH of Alzhiemers dementia, anxiety, depression, pAfib, HTN, colon cancer with resection, former alcohol abuse who presents to the hospital for recurrent falls (arrived from memory care unit). [facility] reported they "~"are unable to care for him. Patient has been in the hospital since 24. It appears that he has had multiple complications including DVT of RLE, polycythemia, pressure wounds, rashes, agitation/dementia. His dementia is very advanced. Nephrology "~"is now being consulted for hypernatremia with his Na rising from 145--&gt; 149 over the past day. Per primary team, there are ongoing hospice discussions. "~"Past Medical History"~"-: "~"Alzhiemers dementia"~"anxiety "~"depression"~"Afib"~"HTN"~"colon cancer"~"former alcohol abuse"~"BPH"~"cholelithiasis "~"recurrent pressure wounds"~"Social History"~"Tobacco: Non-Smoker"~"Alcohol: Former"~"Drug: None"~"Personal: "~"Living: Other ([facility] )"~"Family History"~"Family History: Not Pertinent"~"Allergies / Home Medications"~"-: "~"Allergy/AdvReac	Type	Severity	Reaction	Status	Date / Time"~"No Known Allergies	Allergy			Verified	24 12:41"~" Medication	 Instructions	 Recorded	 Confirmed	 Type"~"amlodipine 5 mg tablet	5 mg PO DAILY Blood Pressure	23	History"~"aspirin 81 mg tablet,delayed	81 mg PO DAILY Blood Clot	23	History"~"release	Prevention/Tx			"~"carvedilol 25 mg tablet	25 mg PO BID Heart Failure	23	History"~"duloxetine 20 mg capsule,delayed	20 mg PO DAILY Pain	23	History"~"release				"~"lamotrigine 150 mg tablet	150 mg PO DAILY Neurological	23	History"~"	Condition			"~"melatonin 5 mg tablet	5 mg PO HS Sleep	23	History"~"olanzapine 5 mg disintegrating	5 mg PO BIDPRN PRN anxiety	23	History"~"tablet				"~"potassium chloride 20 mEq	40 meq PO DAILY Electrolyte	23	History"~"tablet,extended release(part/cryst)	Repletion			"~"tamsulosin 0.4 mg capsule	0.4 mg PO HS Urinary Issue	23	History"~"trazodone 100 mg tablet	100 mg PO HS Sleep	23	History"~"trazodone 50 mg tablet	25 mg PO HS Sleep	23	History"~"diphenoxylate-atropine 2.5	1 tab PO QID diarrhea	24	History"~"mg-0.025 mg tablet				"~"olanzapine 7.5 mg tablet	7.5 mg PO TID Mental Health/Anxiety	24	History"~"Review of Systems"~"-"~"Unable to obtain full review of systems at this time due to: Dementia"~"History Source: Physician"~"Physical Exam"~"Vital Signs"~"-: "~"Vital Signs"~"Temp	Pulse	Resp	BP	Pulse Ox"~" 97.3 F 	 75 	 18 	 155/96 	 96 "~" 24 07:30	 24 08:55	 24 07:30	 24 08:55	 24 07:30"~"Lab Results"~"-: "~"WBC	 9.9 10^3/uL (4.8-10.8)  	24  07:27    "~"RBC	 5.97 10^6/uL (4.70-6.10)  	24  07:27    "~"Hgb	 18.7 g/dL (13.0-18.0)  H 	24  07:27    "~"Hct	 55.6 % (39.0-52.0)  H 	24  07:27    "~"Plt Count	 73 10^3/uL (130-400)  L 	24  07:27    "~"Sodium	 149 mmol/L (135-145)  H 	24  07:27    "~"Potassium	 3.8 mmol/L (3.5-5.1)  	24  07:27    "~"Chloride	 107 mmol/L ()  	24  07:27    "~"Carbon Dioxide	 31 mmol/L (22-30)  H 	24  07:27    "~"BUN	 28 mg/dl (9-20)  H 	24  07:27    "~"Creatinine	 0.8 mg/dL (0.7-1.3)  	24  07:27    "~"eGFR	 &gt; 60.00  	24  07:27    "~"Glucose	 83 mg/dl (70-99)  	24  07:27    "~"Calcium	 10.1 mg/dl (8.4-10.2)  	24  07:27    "~"Phosphorus	 3.4 mg/dl (2.5-4.5)  	24  07:27    "~"Albumin	 4.6 g/dl (3.5-5.0)  	24  07:27    "~"Physical Exam"~"General: Awake, No Distress and Nontoxic"~"HEENT: PERRL, EOMI, Anicteric, Conjunctivae Clear and Ear/Nose Intact"~"Respiratory: Clear"~"Cardiac: S1/S2, Regular Rate/Rhythm and No Edema"~"Breast: N/A"~"Abdomen: Soft, Nontender and Nondistended"~"Rectal: Deferred by Provider"~"Musculoskeletal: No Clubbing and No Cyanosis"~"Skin: Warm and Dry"~"Neuro: Other (AO to name only)"~"Psych: Other (calm)"~"Assessment/Plan"~"-"~"-: "~"Assessment: "~"hypernatremia "~"end stage dementia"~"pressure wounds"~"dvt"~"afib "~"recurrent falls"~"Plan:"~"- patient has about a 2.5L free water deficit not accounting for free water losses"~"- please initiate DW at 100cc/hr with plans to continue until Na improves"~"- likely developed hypernatremia in the setting of advanced dementia and poor intake "~"- monitor I/Os"~"- ethics meeting pendign "~"Data Reviewed"~"-"~"Radiology: Image Personally Visualized and interpreted (CXR clear)"~"CT Scan: Report Reviewed by me"~"Ultrasound: Report Reviewed by me"~"Labs: Labs Reviewed by me and Discussed with Physician"~"Old Records: Reviewed"

## 2024-03-15 ENCOUNTER — HOSPITAL ENCOUNTER (INPATIENT)
Dept: HOSPITAL 99 - 2 NORTH | Age: 84
LOS: 4 days | DRG: 951 | End: 2024-03-19
Payer: COMMERCIAL

## 2024-03-15 VITALS — SYSTOLIC BLOOD PRESSURE: 154 MMHG | RESPIRATION RATE: 18 BRPM | DIASTOLIC BLOOD PRESSURE: 90 MMHG

## 2024-03-15 VITALS — DIASTOLIC BLOOD PRESSURE: 83 MMHG | SYSTOLIC BLOOD PRESSURE: 154 MMHG | RESPIRATION RATE: 16 BRPM

## 2024-03-15 VITALS — RESPIRATION RATE: 16 BRPM | DIASTOLIC BLOOD PRESSURE: 71 MMHG | SYSTOLIC BLOOD PRESSURE: 131 MMHG

## 2024-03-15 DIAGNOSIS — D64.9: ICD-10-CM

## 2024-03-15 DIAGNOSIS — G30.9: ICD-10-CM

## 2024-03-15 DIAGNOSIS — Z51.5: Primary | ICD-10-CM

## 2024-03-15 DIAGNOSIS — L89.152: ICD-10-CM

## 2024-03-15 DIAGNOSIS — Q61.02: ICD-10-CM

## 2024-03-15 DIAGNOSIS — K76.89: ICD-10-CM

## 2024-03-15 DIAGNOSIS — D69.6: ICD-10-CM

## 2024-03-15 DIAGNOSIS — D75.1: ICD-10-CM

## 2024-03-15 DIAGNOSIS — K80.20: ICD-10-CM

## 2024-03-15 DIAGNOSIS — N40.0: ICD-10-CM

## 2024-03-15 DIAGNOSIS — Z85.038: ICD-10-CM

## 2024-03-15 DIAGNOSIS — I10: ICD-10-CM

## 2024-03-15 DIAGNOSIS — E87.0: ICD-10-CM

## 2024-03-15 DIAGNOSIS — I82.401: ICD-10-CM

## 2024-03-15 DIAGNOSIS — R29.6: ICD-10-CM

## 2024-03-15 DIAGNOSIS — I48.91: ICD-10-CM

## 2024-03-15 DIAGNOSIS — F10.11: ICD-10-CM

## 2024-03-15 DIAGNOSIS — F02.C11: ICD-10-CM

## 2024-03-15 DIAGNOSIS — E83.110: ICD-10-CM

## 2024-03-15 LAB
BUN SERPL-MCNC: 27 MG/DL (ref 9–20)
CALCIUM SERPL-MCNC: 8.9 MG/DL (ref 8.4–10.2)
CHLORIDE SERPL-SCNC: 110 MMOL/L (ref 98–107)
CO2 SERPL-SCNC: 27 MMOL/L (ref 22–30)
EGFR: > 60
ERYTHROCYTE [DISTWIDTH] IN BLOOD BY AUTOMATED COUNT: 13.2 % (ref 11.5–14.5)
ESTIMATED CREATININE CLEARANCE: 88 ML/MIN
GLUCOSE SERPL-MCNC: 114 MG/DL (ref 70–99)
HCT VFR BLD AUTO: 49.9 % (ref 39–52)
HGB BLD-MCNC: 17 G/DL (ref 13–18)
MAGNESIUM SERPL-MCNC: 2.1 MG/DL (ref 1.6–2.3)
MCHC RBC AUTO-ENTMCNC: 34.1 G/DL (ref 33–37)
MCV RBC AUTO: 92.8 FL (ref 80–94)
PLATELET # BLD AUTO: 69 10^3/UL (ref 130–400)
POTASSIUM SERPL-SCNC: 3.5 MMOL/L (ref 3.5–5.1)
SODIUM SERPL-SCNC: 143 MMOL/L (ref 135–145)

## 2024-03-15 RX ADMIN — MORPHINE SULFATE 2 MG: 2 INJECTION, SOLUTION INTRAMUSCULAR; INTRAVENOUS at 18:42

## 2024-03-15 RX ADMIN — LORAZEPAM 0.5 MG: 0.5 TABLET ORAL at 02:15

## 2024-03-15 RX ADMIN — QUETIAPINE 50 MG: 25 TABLET, FILM COATED ORAL at 09:51

## 2024-03-15 RX ADMIN — HALOPERIDOL LACTATE 0.5 MG: 5 INJECTION, SOLUTION INTRAMUSCULAR at 21:20

## 2024-03-15 RX ADMIN — QUETIAPINE 25 MG: 25 TABLET, FILM COATED ORAL at 14:57

## 2024-03-15 RX ADMIN — MORPHINE SULFATE 2 MG: 2 INJECTION, SOLUTION INTRAMUSCULAR; INTRAVENOUS at 22:52

## 2024-03-15 RX ADMIN — QUETIAPINE: 25 TABLET, FILM COATED ORAL at 21:17

## 2024-03-15 RX ADMIN — MORPHINE SULFATE 2 MG: 2 INJECTION, SOLUTION INTRAMUSCULAR; INTRAVENOUS at 14:59

## 2024-03-15 RX ADMIN — AMLODIPINE BESYLATE 5 MG: 5 TABLET ORAL at 09:52

## 2024-03-15 RX ADMIN — CARVEDILOL 12.5 MG: 12.5 TABLET, FILM COATED ORAL at 09:52

## 2024-03-15 RX ADMIN — HYDROPHOR 1 APPLIC: 42 OINTMENT TOPICAL at 09:51

## 2024-03-15 RX ADMIN — CETIRIZINE HYDROCHLORIDE 10 MG: 10 TABLET ORAL at 09:52

## 2024-03-15 RX ADMIN — DEXTROSE MONOHYDRATE 1000: 5 INJECTION, SOLUTION INTRAVENOUS at 00:00

## 2024-03-15 RX ADMIN — ANTI-FUNGAL POWDER MICONAZOLE NITRATE TALC FREE 1 APPLIC: 1.42 POWDER TOPICAL at 09:51

## 2024-03-15 RX ADMIN — LORAZEPAM 0.25 MG: 0.5 TABLET ORAL at 09:50

## 2024-03-15 RX ADMIN — THIAMINE HYDROCHLORIDE 100 MG: 100 INJECTION, SOLUTION INTRAMUSCULAR; INTRAVENOUS at 09:52

## 2024-03-15 RX ADMIN — APIXABAN 10 MG: 5 TABLET, FILM COATED ORAL at 09:51

## 2024-03-15 NOTE — W.DCSUMMARY
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 Patient was sent "~"to the emergency room for evaluation due to yet another fall in the morning of the day of presentation along with an abrasion to his left elbow with some right hip pain -- and no one was able to pick him up. [facility] reported they were unable "~"to take care of him as he required too much care. Patient had a recent diarrheal illness in December 2023 and after the diarrheal illness, he started falling. Per patient's family, patient was agitated so agitation medications were increased, and "~"there had been an issue with too much sedation versus agitation. Patient also had an itchy rash in his left chest and axillary area. CT Head did not show any acute changes and CT Cervical Spine did not show any acute changes. No hip fracture or "~"dislocation was identified on initial imaging. Due to concerns over bradycardia and pauses on telemetry, patient's Coreg medication was held, and cardiology was consulted, cardiology noted there were no long pauses or profound bradycardia on his "~"telemetry monitor and therefore there was no indication that a permanent pacemaker would help, and echocardiogram was ordered. Eventually patient's Coreg was restarted."~"Infectious Disease was consulted for patient's rash on his left chest, and Keflex antibiotics was started for possible folliculitis. "~"Psychiatry was consulted for patient's agitation and some of his routine medications were adjusted/stopped to see whether that would help with the falls. Anticholinergic effects from some of his medications were a concern. Patient required Zyprexa "~"and as needed Ativan for agitation. Patient required a 1:1 sitter, and it was noted that patient needed to be off the 1:1 sitter for at least 24 hours before patient could be considered for acceptance to a Skilled Nursing Facility. Patient did need "~"to be moved to a nurses station for close monitoring. Patient became more calm during his hospitalization but still had periods of increased agitation and becoming easily very agitated, with including times when staff were trying to perform routine "~"care for him. Patient also had a Code Purple called for agitation, and also needed 4-point restraints. Patient also tried to get up on his own but was noted to be very unsteady on his feet. Patient was too agitated to work with physical therapy. "~"Psychiatry continued to adjust his medications throughout the hospitalization. Patient was later started on Depakote but it was discontinued due to worsening thrombocytopenia. "~"It was noted that patient became upset/agitated with any kind of drawing blood, and with any other care including vital signs check or any other routine care would make him agitated. It was noted that a dementia unit would be more appropriate for "~"the patient but it was noted he would not be accepted unless he able to be off of 1:1 sitter."~"Patient's rash became itchy later on and was on the trunk and his legs - after some of his psychiatric medications were stopped, his rash improved. General surgery mentioned that patient can have rash followed-up outpatient with dermatology. Later, "~"Doxycycline and Cetirizine were also given for possible folliculitis and itching."~"Patient developed a deep vein thrombosis in his right lower extremity (in the setting of being too agitated to get sequential compression devices/pneumatic compression sleeves and too agitated for needle sticks with Lovenox/Heparin) and was started "~"on Eliquis, which he was able to take. Patient was found to be developing a new pressure wound based on CT imaging."~"Patient developed polycythemia and hypernatremia - likely due to dehydration. Patient was able to be given some intravenous fluids with improvement in lab-work. Patient's blood pressure was noted to get very elevated (into the 200s systolic blood "~"pressure -- likely from his agitation, and his Amlodipine was continued."~"Patient's behavior demonstrated dementia progression. Ethics meeting was held on March 14, 2024 and it was decided that given patient's advancing dementia, agitation, sedation, weight loss, and very high falls risk, hospice would be the best option "~"in this case, which patient's family accepted. "~"Discharge Plan"~"-"~"Patient Disposition: Hospice - Inpatient DH"~"Discharge Orders:"~"Discharge Patient  (As Directed); Ordered 03/15/24"~"   Ordered By:  Murray Gutierrez"

## 2024-03-15 NOTE — ADM.HSP
"Admission - Hospice"~"History of Present Illness"~"-: "~"Please see discharge summary from today for History of Present Illness"~"Assessment/Medication Plan"~"-: "~"Generic Name	Dose Route	Start	Last Admin"~"  Trade Name	Freq  PRN Reason	Stop	Dose Admin"~"Acetaminophen	 650 mg	 03/15/24 12:25	"~"  Acetaminophen 650 Mg Rectal Suppository	 RECTAL	 04/12/24 12:24	"~"	 Q4HPRN PRN		"~"	 mild pain, HA, or temp &gt;100.4F		"~"Acetaminophen	 650 mg	 03/15/24 12:25	"~"  Acetaminophen 325 Mg Tablet	 PO	 04/12/24 12:24	"~"	 Q4HPRN PRN		"~"	 mild pain, HA, or temp &gt;100.4F		"~"Al Hydrox/Mg Hydrox/Simethicone	 30 ml	 03/15/24 12:25	"~"  Mag/Al/Simethicone Suspension 30 Ml Cup	 PO	 04/12/24 12:24	"~"	 Q6HPRN PRN		"~"	 heartburn		"~"Bisacodyl	 10 mg	 03/15/24 12:25	"~"  Bisacodyl 10 Mg Rectal Suppository	 RECTAL	 04/12/24 12:24	"~"	 DAILYPRN PRN		"~"	 if no BM for 3 days		"~"Calamine	 0 ml	 03/15/24 12:23	"~"  Calamine Lotion 120 Ml Bottle (6 Oz)	 TOPICAL	 04/12/24 12:22	"~"	 TIDPRN PRN		"~"	 itching		"~"Cetirizine HCl	 10 mg	 03/15/24 13:00	"~"  Cetirizine Hcl 10 Mg Tablet	 PO	 04/12/24 12:59	"~"	 DAILY EDUARDO		"~"Glycopyrrolate	 0.2 mg	 03/15/24 12:25	"~"  Glycopyrrolate 0.2 Mg/Ml Vial	 IV	 04/12/24 12:24	"~"	 Q4HPRN PRN		"~"	 excessive secretions		"~"Haloperidol Lactate	 0.5 mg	 03/15/24 12:25	"~"  Haloperidol Oral Concentrate (10 Mg/5 Ml) Cup	 SL	 04/12/24 12:24	"~"	 Q4HPRN PRN		"~"	 agitation/terminal delirium		"~"Haloperidol Lactate	 0.5 mg	 03/15/24 12:25	"~"  Haloperidol 5 Mg/Ml 1 Ml Vial	 IV	 04/12/24 12:24	"~"	 Q4HPRN PRN		"~"	 agitation/terminal delirium		"~"Hyoscyamine Sulfate	 0.125 mg	 03/15/24 12:25	"~"  Hyoscyamine Sulfate 0.125 Mg/Ml In Oral Syringe	 SL	 04/12/24 12:24	"~"	 Q4HPRN PRN		"~"	 excessive secretions		"~"Lorazepam	 0.25 mg	 03/15/24 20:00	"~"  Lorazepam 0.5 Mg Tablet	 PO	 04/12/24 19:59	"~"	 BID EDUARDO		"~"Lorazepam	 2 mg	 03/15/24 12:25	"~"  Lorazepam 2 Mg/Ml Vial	 IV	 04/12/24 12:24	"~"	 Q1HPRN PRN		"~"	 seizure		"~"	 Protocol		"~"Lorazepam	 0.5 mg	 03/15/24 12:25	"~"  Lorazepam 2 Mg/Ml Vial	 IV	 04/12/24 12:24	"~"	 Q2HPRN PRN		"~"	 anxiety		"~"	 Protocol		"~"Lorazepam	 0.5 mg	 03/15/24 12:25	"~"  Lorazepam 0.5 Mg Tablet	 PO	 04/12/24 12:24	"~"	 Q2HPRN PRN		"~"	 anxiety		"~"Magnesium Hydroxide	 30 ml	 03/15/24 12:25	"~"  Milk Of Magnesia 30 Ml Cup	 PO	 04/12/24 12:24	"~"	 HSPRN PRN		"~"	 constipation		"~"Melatonin	 5 mg	 03/15/24 22:00	"~"  Melatonin 5 Mg Tablet	 PO	 04/12/24 21:59	"~"	 HS EDUARDO		"~"Morphine Sulfate	 2 mg	 03/15/24 12:25	"~"  Morphine 2 Mg/Ml Syringe	 IV	 03/29/24 12:24	"~"	 Q1HPRN PRN		"~"	 moderate-severe pain / dyspnea		"~"Ondansetron HCl	 4 mg	 03/15/24 12:25	"~"  Ondansetron 4 Mg/2 Ml Vial	 IV	 04/12/24 12:24	"~"	 Q6HPRN PRN		"~"	 nausea/vomiting		"~"Prochlorperazine Edisylate	 5 mg	 03/15/24 12:25	"~"  Prochlorperazine 10 Mg/2 Ml Vial	 IV	 04/12/24 12:24	"~"	 Q6HPRN PRN		"~"	 nausea/vomiting		"~"Quetiapine Fumarate	 50 mg	 03/16/24 08:00	"~"  Quetiapine 25 Mg Tablet	 PO	 04/13/24 07:59	"~"	 DAILY EDUARDO		"~"Quetiapine Fumarate	 25 mg	 03/15/24 14:00	"~"  Quetiapine 25 Mg Tablet	 PO	 04/12/24 13:59	"~"	 BID EDUARDO		"~"Tamsulosin HCl	 0.4 mg	 03/15/24 22:00	"~"  Tamsulosin 0.4 Mg Capsule	 PO	 04/12/24 21:59	"~"	 HS EDUARDO		"

## 2024-03-15 NOTE — PTCARENOTE
"Pt restless, stripping clothes and brief, unable to be redirected or follow directions, attempting to pull IV.  Incontinence care provided, 1:1 time provided without any + effect, PRN Ativan administered. Pt remains restless and physically "~"aggressive towards staff during care.  "

## 2024-03-15 NOTE — HOSPNOTE
"Patient has been admitted under GIP level of hospice care. Patient requires GIP level of hospice care for the management of agitation and pain with the use of IV medications. Daughter Jory updated on medications and GIP level of hospice care and "~"is in agreement. Informal report with RN Vi. Will attempt to give medications orally and utilize IVP PRN as needed. Patient will be seen daily by hospice. Discharge planning continues. Dr. Gutierrez is updated and in agreement with [facility] level of "~"hospice care. "

## 2024-03-16 VITALS — RESPIRATION RATE: 14 BRPM | SYSTOLIC BLOOD PRESSURE: 162 MMHG | DIASTOLIC BLOOD PRESSURE: 88 MMHG

## 2024-03-16 VITALS — SYSTOLIC BLOOD PRESSURE: 106 MMHG | DIASTOLIC BLOOD PRESSURE: 68 MMHG | RESPIRATION RATE: 20 BRPM

## 2024-03-16 RX ADMIN — LORAZEPAM 0.5 MG: 2 INJECTION INTRAMUSCULAR; INTRAVENOUS at 18:24

## 2024-03-16 RX ADMIN — LORAZEPAM 0.5 MG: 2 INJECTION INTRAMUSCULAR; INTRAVENOUS at 20:55

## 2024-03-16 RX ADMIN — QUETIAPINE: 25 TABLET, FILM COATED ORAL at 13:01

## 2024-03-16 RX ADMIN — MORPHINE SULFATE 2 MG: 2 INJECTION, SOLUTION INTRAMUSCULAR; INTRAVENOUS at 01:46

## 2024-03-16 RX ADMIN — LORAZEPAM 2 MG: 2 INJECTION INTRAMUSCULAR; INTRAVENOUS at 00:10

## 2024-03-16 RX ADMIN — Medication 100: at 15:30

## 2024-03-16 RX ADMIN — MORPHINE SULFATE 2 MG: 2 INJECTION, SOLUTION INTRAMUSCULAR; INTRAVENOUS at 08:29

## 2024-03-16 RX ADMIN — QUETIAPINE: 25 TABLET, FILM COATED ORAL at 21:02

## 2024-03-16 RX ADMIN — LORAZEPAM 0.5 MG: 2 INJECTION INTRAMUSCULAR; INTRAVENOUS at 13:52

## 2024-03-16 RX ADMIN — MORPHINE SULFATE 2 MG: 2 INJECTION, SOLUTION INTRAMUSCULAR; INTRAVENOUS at 10:30

## 2024-03-16 RX ADMIN — LORAZEPAM: 2 INJECTION INTRAMUSCULAR; INTRAVENOUS at 00:04

## 2024-03-16 RX ADMIN — LORAZEPAM 0.5 MG: 2 INJECTION INTRAMUSCULAR; INTRAVENOUS at 08:28

## 2024-03-16 NOTE — PTCARENOTE
"At start of shift patient was unable to follow directions to swallow applesauce and therefore unable to take PO medications. IV medications given per prn MAR. at approx 00:00 pt awoke and become aggressive and trying to grab at staff. PRN ativan "~"given at that time See MAR"

## 2024-03-16 NOTE — HOSPNOTE
"Patient remains GIP level of care appropriate, required Morphine IV x 6 in last 20 hours for grimacing and apparent pain.  Patient also received Ativan x 2 and Haldol x 1 in same time frame.  Patient has an extensive history of agitation and "~"aggression but staff and this hospice nurse see pain an the present problem.  Hospice nurse called daughter Jory and spoke to Dr Gutierrez.  discussed my recommendation for a continuos morphine infusion as comfort is consistently obtained with "~"morphine alone, so agitation does not seem to be the current problem. Daughter is in agreement that the family as seem less aggressive behaviors and more grimacing and pain signs. Daughter is currently at lunch with family she will convey our "~"conversation and call the hospice nurse back with their thoughts. Hospice nurse offered a conference call with family if they wanted one. "

## 2024-03-17 VITALS — DIASTOLIC BLOOD PRESSURE: 70 MMHG | SYSTOLIC BLOOD PRESSURE: 112 MMHG | RESPIRATION RATE: 6 BRPM

## 2024-03-17 VITALS — DIASTOLIC BLOOD PRESSURE: 80 MMHG | SYSTOLIC BLOOD PRESSURE: 122 MMHG | RESPIRATION RATE: 8 BRPM

## 2024-03-17 RX ADMIN — MORPHINE SULFATE 2 MG: 2 INJECTION, SOLUTION INTRAMUSCULAR; INTRAVENOUS at 08:47

## 2024-03-17 RX ADMIN — MORPHINE SULFATE 2 MG: 2 INJECTION, SOLUTION INTRAMUSCULAR; INTRAVENOUS at 13:42

## 2024-03-17 RX ADMIN — MORPHINE SULFATE 2 MG: 2 INJECTION, SOLUTION INTRAMUSCULAR; INTRAVENOUS at 11:26

## 2024-03-17 RX ADMIN — LORAZEPAM 0.5 MG: 2 INJECTION INTRAMUSCULAR; INTRAVENOUS at 02:35

## 2024-03-17 RX ADMIN — LORAZEPAM 0.5 MG: 2 INJECTION INTRAMUSCULAR; INTRAVENOUS at 14:48

## 2024-03-17 RX ADMIN — MORPHINE SULFATE 2 MG: 2 INJECTION, SOLUTION INTRAMUSCULAR; INTRAVENOUS at 14:47

## 2024-03-17 RX ADMIN — QUETIAPINE: 25 TABLET, FILM COATED ORAL at 13:02

## 2024-03-17 RX ADMIN — SCOLOPAMINE TRANSDERMAL SYSTEM 1 PATCH: 1 PATCH, EXTENDED RELEASE TRANSDERMAL at 15:59

## 2024-03-17 RX ADMIN — MORPHINE SULFATE 2 MG: 2 INJECTION, SOLUTION INTRAMUSCULAR; INTRAVENOUS at 03:36

## 2024-03-17 RX ADMIN — MORPHINE SULFATE 2 MG: 2 INJECTION, SOLUTION INTRAMUSCULAR; INTRAVENOUS at 15:59

## 2024-03-17 RX ADMIN — MORPHINE SULFATE 2 MG: 2 INJECTION, SOLUTION INTRAMUSCULAR; INTRAVENOUS at 15:15

## 2024-03-17 RX ADMIN — MORPHINE SULFATE 2 MG: 2 INJECTION, SOLUTION INTRAMUSCULAR; INTRAVENOUS at 19:30

## 2024-03-17 RX ADMIN — GLYCOPYRROLATE 0.2 MG: 0.2 INJECTION INTRAMUSCULAR; INTRAVENOUS at 08:47

## 2024-03-17 RX ADMIN — GLYCOPYRROLATE 0.2 MG: 0.2 INJECTION INTRAMUSCULAR; INTRAVENOUS at 15:16

## 2024-03-17 RX ADMIN — MORPHINE SULFATE 2 MG: 2 INJECTION, SOLUTION INTRAMUSCULAR; INTRAVENOUS at 04:11

## 2024-03-17 NOTE — PTCARENOTE
"Patient with intermittent periods of drowsiness and restlessness throughout shift, occasionally grimacing and moving in bed. Breathing shallow with frequent periods of apnea; PRN doses of morphine and ativan administered as needed for patient's "~"symptoms - see MAR. R buttock wound covered with foam, pillow placed under patient for comfort; static air overlay in place on top of bed. PRN robinul given for occasional oral secretions. This RN in communication with MD and Hospice RN throughout "~"shift; morphine gtt increased to step 2 per protocol, scopolamine patch ordered and placed behind L ear, and roland catheter placed for end of life comfort."

## 2024-03-17 NOTE — HOSPNOTE
"patient remains GIP appropriate level of care.  Morphine continuous infusion at step 1.  Morphine prn x 5 since infusion started.  Ativan x 3 in last 24 hours. Significant decline over last 48 hours. Patient with apnea this am.  Notified by "~"physician and facility nurse of rapid decline.  Hospice nurse called daughter Jory discussed decline, discussed the low dosing of morphine but if they were uncomfortable with the sudden decline we could alter the morphine, but in my opinion the "~"dose he is receiving did not cause decline.  After much review of patient status over last 2-3 weeks po intake has declined, agitation has declined and pain has been the most distressing to patient and family.  "~"Daughter is comfortable with maintaining the morphine continuous infusion. "~"Family visiting upon hospice nurse arrival to hospital, patient appears to be in pain grimacing and moving in bed.  Repositioned without relief and morphine prn given.  Support provided to the family. "

## 2024-03-18 VITALS — SYSTOLIC BLOOD PRESSURE: 83 MMHG | RESPIRATION RATE: 12 BRPM | DIASTOLIC BLOOD PRESSURE: 50 MMHG

## 2024-03-18 VITALS — RESPIRATION RATE: 12 BRPM | DIASTOLIC BLOOD PRESSURE: 55 MMHG | SYSTOLIC BLOOD PRESSURE: 99 MMHG

## 2024-03-18 RX ADMIN — MORPHINE SULFATE 2 MG: 2 INJECTION, SOLUTION INTRAMUSCULAR; INTRAVENOUS at 05:46

## 2024-03-18 RX ADMIN — MORPHINE SULFATE 2 MG: 2 INJECTION, SOLUTION INTRAMUSCULAR; INTRAVENOUS at 11:02

## 2024-03-18 RX ADMIN — MORPHINE SULFATE 2 MG: 2 INJECTION, SOLUTION INTRAMUSCULAR; INTRAVENOUS at 12:59

## 2024-03-18 RX ADMIN — MORPHINE SULFATE 2 MG: 2 INJECTION, SOLUTION INTRAMUSCULAR; INTRAVENOUS at 09:26

## 2024-03-18 RX ADMIN — Medication 100: at 18:08

## 2024-03-18 NOTE — HOSPNOTE
visited patient in room at [facility]. Patient was sleeping and did not awake to prompting. Chaplian provided support through prayer and companionship.  will visit three times per week

## 2024-03-18 NOTE — HOSPNOTE
"IV Morphine hourly drip increased to 3mg/hr due to unmanaged labored breathing.Patient does not indicate pain or discomfort when repositioned. He is having 60 to 70 second periods of apnea. He will remain GIP due to needing frequent skilled "~"assessment of symptoms for titration of medications for optimal comfort. Spoke to the patients daughter during this visit to update her . She will be visiting this afternoon"

## 2024-03-19 VITALS — SYSTOLIC BLOOD PRESSURE: 97 MMHG | DIASTOLIC BLOOD PRESSURE: 59 MMHG | RESPIRATION RATE: 12 BRPM

## 2024-03-19 RX ADMIN — MORPHINE SULFATE 3 MG: 2 INJECTION, SOLUTION INTRAMUSCULAR; INTRAVENOUS at 10:13

## 2024-03-19 RX ADMIN — MORPHINE SULFATE 3 MG: 2 INJECTION, SOLUTION INTRAMUSCULAR; INTRAVENOUS at 13:24

## 2024-03-19 RX ADMIN — MORPHINE SULFATE 3 MG: 2 INJECTION, SOLUTION INTRAMUSCULAR; INTRAVENOUS at 14:18

## 2024-03-19 RX ADMIN — MORPHINE SULFATE 3 MG: 2 INJECTION, SOLUTION INTRAMUSCULAR; INTRAVENOUS at 16:10

## 2024-03-19 NOTE — PTCARENOTE
Daughter notified this RN that she thinks pt. has passed. Dr. Cash notified. Pt found to be breathless with no pulse.

## 2024-03-19 NOTE — W.DCSUMMARY
"Discharge Summary"~"Discharge Data"~"Date of Admission: 03/15/24"~"Date of Discharge: 03/19/24"~"-"~"Pending Results: No"~"Discharge Plan"~"-"~"Referrals:"~"UNKNOWN - PT DOES,NOT KNOW [Family Provider] - "~"Prescriptions:"~"No Action"~"  lamotrigine 150 mg tablet "~"   150 mg PO DAILY "~"  carvedilol 25 mg tablet "~"   25 mg PO BID "~"  trazodone 50 mg tablet "~"   25 mg PO HS "~"   Rx Instructions:"~"   taken w/ 100mg = 125mg"~"  amlodipine 5 mg tablet "~"   5 mg PO DAILY "~"  aspirin 81 mg tablet,delayed release (DR/EC) "~"   81 mg PO DAILY "~"  potassium chloride 20 mEq tablet,ER particles/crystals "~"   40 meq PO DAILY "~"  tamsulosin 0.4 mg capsule "~"   0.4 mg PO HS "~"  trazodone 100 mg tablet "~"   100 mg PO HS "~"   Rx Instructions:"~"   taken w/ 25mg = 125mg"~"  olanzapine 5 mg tablet,disintegrating "~"   5 mg PO BIDPRN PRN (Reason: anxiety) "~"  duloxetine 20 mg capsule,delayed release(DR/EC) "~"   20 mg PO DAILY "~"  melatonin 5 mg tablet "~"   5 mg PO HS "~"  diphenoxylate-atropine 2.5-0.025 mg tablet "~"   1 tab PO QID "~"  olanzapine 7.5 mg tablet "~"   7.5 mg PO TID "

## 2024-03-19 NOTE — HOSPNOTE
"Spoke with daughter and explained the dying process. Emotional support given. Patient is on a morphine drip and appears comfortable at this time. Asked nurses to please medicate prior to any care or repositioning since patient can become quite "~"agitated. Will see patient daily. "